# Patient Record
Sex: MALE | Race: WHITE | NOT HISPANIC OR LATINO | Employment: FULL TIME | URBAN - METROPOLITAN AREA
[De-identification: names, ages, dates, MRNs, and addresses within clinical notes are randomized per-mention and may not be internally consistent; named-entity substitution may affect disease eponyms.]

---

## 2019-08-01 ENCOUNTER — TRANSCRIBE ORDERS (OUTPATIENT)
Dept: ADMINISTRATIVE | Facility: HOSPITAL | Age: 59
End: 2019-08-01

## 2019-08-01 DIAGNOSIS — G89.29 CHRONIC RIGHT SHOULDER PAIN: Primary | ICD-10-CM

## 2019-08-01 DIAGNOSIS — M25.511 CHRONIC RIGHT SHOULDER PAIN: Primary | ICD-10-CM

## 2019-08-07 ENCOUNTER — HOSPITAL ENCOUNTER (OUTPATIENT)
Dept: GENERAL RADIOLOGY | Facility: HOSPITAL | Age: 59
Discharge: HOME | End: 2019-08-07
Admitting: ORTHOPAEDIC SURGERY

## 2019-08-07 ENCOUNTER — HOSPITAL ENCOUNTER (OUTPATIENT)
Dept: GENERAL RADIOLOGY | Facility: HOSPITAL | Age: 59
End: 2019-08-07

## 2019-08-07 ENCOUNTER — HOSPITAL ENCOUNTER (OUTPATIENT)
Dept: MRI IMAGING | Facility: HOSPITAL | Age: 59
Discharge: HOME | End: 2019-08-07

## 2019-08-07 ENCOUNTER — APPOINTMENT (OUTPATIENT)
Dept: MRI IMAGING | Facility: HOSPITAL | Age: 59
End: 2019-08-07

## 2019-08-07 DIAGNOSIS — M25.511 CHRONIC RIGHT SHOULDER PAIN: ICD-10-CM

## 2019-08-07 DIAGNOSIS — G89.29 CHRONIC RIGHT SHOULDER PAIN: ICD-10-CM

## 2019-08-07 PROCEDURE — 73222 MRI JOINT UPR EXTREM W/DYE: CPT

## 2019-08-07 PROCEDURE — 25010000002 IOPAMIDOL 61 % SOLUTION: Performed by: RADIOLOGY

## 2019-08-07 PROCEDURE — 25010000003 LIDOCAINE 1 % SOLUTION: Performed by: RADIOLOGY

## 2019-08-07 PROCEDURE — A9577 INJ MULTIHANCE: HCPCS | Performed by: RADIOLOGY

## 2019-08-07 PROCEDURE — 0 GADOBENATE DIMEGLUMINE 529 MG/ML SOLUTION: Performed by: RADIOLOGY

## 2019-08-07 PROCEDURE — 77002 NEEDLE LOCALIZATION BY XRAY: CPT

## 2019-08-07 RX ORDER — LIDOCAINE HYDROCHLORIDE 10 MG/ML
10 INJECTION, SOLUTION INFILTRATION; PERINEURAL ONCE
Status: COMPLETED | OUTPATIENT
Start: 2019-08-07 | End: 2019-08-07

## 2019-08-07 RX ADMIN — LIDOCAINE HYDROCHLORIDE 2 ML: 10 INJECTION, SOLUTION INFILTRATION; PERINEURAL at 08:39

## 2019-08-07 RX ADMIN — IOPAMIDOL 3 ML: 612 INJECTION, SOLUTION INTRAVENOUS at 08:39

## 2019-08-07 RX ADMIN — GADOBENATE DIMEGLUMINE 0.05 ML: 529 INJECTION, SOLUTION INTRAVENOUS at 08:39

## 2019-08-29 ENCOUNTER — TRANSCRIBE ORDERS (OUTPATIENT)
Dept: PHYSICAL THERAPY | Facility: CLINIC | Age: 59
End: 2019-08-29

## 2019-08-29 ENCOUNTER — OFFICE VISIT (OUTPATIENT)
Dept: PHYSICAL THERAPY | Facility: CLINIC | Age: 59
End: 2019-08-29

## 2019-08-29 DIAGNOSIS — M25.511 ACUTE PAIN OF RIGHT SHOULDER: Primary | ICD-10-CM

## 2019-08-29 DIAGNOSIS — M75.81 TENDINITIS OF RIGHT ROTATOR CUFF: Primary | ICD-10-CM

## 2019-08-29 PROCEDURE — 97110 THERAPEUTIC EXERCISES: CPT | Performed by: PHYSICAL THERAPIST

## 2019-08-29 PROCEDURE — 97161 PT EVAL LOW COMPLEX 20 MIN: CPT | Performed by: PHYSICAL THERAPIST

## 2019-08-29 PROCEDURE — 97014 ELECTRIC STIMULATION THERAPY: CPT | Performed by: PHYSICAL THERAPIST

## 2019-08-29 NOTE — PROGRESS NOTES
Physical Therapy Initial Evaluation and Plan of Care        Subjective Evaluation    History of Present Illness  Mechanism of injury: Patient reports that 12 years ago he had surgery for a torn labral tear which he recovered completely.  Two years ago he started having increased right shoulder pain which has been progressively been getting worse and limiting function.  He states that he can no longer lift arm over shoulder, reach forward, sleep on right side or lift anything more than a glass of water without increased pain. Also having problems with cutting grass and general household activities.        Job Details:  [employer]   - mostly driving [employer]                Quality of life: good    Pain  Quality: sharp, dull ache and throbbing  Alleviating factors: anti inflammatory as needed.  Aggravating factors: sleeping, outstretched reach and overhead activity    Social Support  Lives with: spouse    Hand dominance: right    Diagnostic Tests  MRI studies: abnormal    Patient Goals  Patient goals for therapy: decreased pain, increased strength, increased motion, return to sport/leisure activities and independence with ADLs/IADLs             Objective       Static Posture     Head  Forward.    Shoulders  Asymmetric shoulders and depressed.    Postural Observations  Seated posture: fair  Standing posture: fair  Correction of posture: has no consistent effect        Palpation     Right   Hypertonic in the anterior deltoid and biceps. Tenderness of the anterior deltoid, biceps and upper trapezius.     Tenderness     Right Shoulder  Tenderness in the biceps tendon (proximal), bicipital groove and supraspinatus tendon.     Active Range of Motion     Right Shoulder   Flexion: 85 degrees   Abduction: 65 degrees   External rotation 45°: 30 degrees     Strength/Myotome Testing     Right Shoulder     Planes of Motion   Flexion: 3+   Abduction: 3+   External rotation at 0°: 3     Additional Strength  Details  Atrophy noted along right upper trap and serratus anterior    Tests     Right Shoulder   Positive drop arm, empty can and sulcus sign.   Negative Hawkin's.          Assessment & Plan     Assessment  Impairments: abnormal or restricted ROM, activity intolerance, lacks appropriate home exercise program and pain with function  Assessment details: Patient is a 58 y/o male who present with chronic right shoulder pain in dominant limb.  He has poor postural awareness with elevated right scapular and rounded shoulder.  Atrophy noted along upper trap on Right.  Rom is significantly limited secondary to pain.  Strength limited to 3+/5 throughout except for ER with is 3/5 and very painful.   Prognosis: good  Functional Limitations: carrying objects, lifting, uncomfortable because of pain, reaching behind back and reaching overhead  Goals  Plan Goals: Long Term Goals (12 weeks)    Patient is able to return to work/community activities with minimal to no discomfort  Patient is able to lift 15 lbs from floor to waist  Patient is able to lift 10 lbs from waist to shoulder  Patient is able to lift 5 lbs from shoulder to overhead  Patient is able to work overhead as needed for work/community activities.       Short Term Goals (6 weeks)    Patient is independent with HEP  Patient is able to sleep without being disrupted by pain.   Patient has full AROM/PROM of right shoulder  Patient has greater than 50% improvement overall.   Patient is able to perform bathing and grooming activities with use of involved UE extremity.   Patient has functional AROM  in order to look over shoulder to drive.   Patient has minimal to no tenderness to palpation.                   Plan  Therapy options: will be seen for skilled physical therapy services  Planned modality interventions: TENS, ultrasound, thermotherapy (hydrocollator packs) and cryotherapy  Planned therapy interventions: manual therapy, soft tissue mobilization, strengthening,  stretching, therapeutic activities, joint mobilization, home exercise program, functional ROM exercises, flexibility and body mechanics training  Frequency: 24 visits.  Treatment plan discussed with: patient        Manual Therapy:    0     mins  43818;  Therapeutic Exercise:    25     mins  48654;     Neuromuscular Geronimo:    0    mins  88134;    Therapeutic Activity:     0     mins  01375;     Gait Trainin     mins  93997;     Ultrasound:     0     mins  10137;    Work Hardening           0      mins 34836  Iontophoresis               0   mins 88941    Timed Treatment:   25   mins   Total Treatment:     55   mins    PT SIGNATURE: Allie Villafuerte, PT   DATE TREATMENT INITIATED: 2019    Initial Certification  Certification Period: 2019  I certify that the therapy services are furnished while this patient is under my care.  The services outlined above are required by this patient, and will be reviewed every 90 days.     PHYSICIAN:       DATE:     Please sign and return via fax to [phone].. Thank you, [facility] Therapy.

## 2019-09-04 ENCOUNTER — OFFICE VISIT (OUTPATIENT)
Dept: PHYSICAL THERAPY | Facility: CLINIC | Age: 59
End: 2019-09-04

## 2019-09-04 DIAGNOSIS — M25.511 ACUTE PAIN OF RIGHT SHOULDER: Primary | ICD-10-CM

## 2019-09-04 PROCEDURE — 97110 THERAPEUTIC EXERCISES: CPT | Performed by: PHYSICAL THERAPIST

## 2019-09-04 PROCEDURE — 97014 ELECTRIC STIMULATION THERAPY: CPT | Performed by: PHYSICAL THERAPIST

## 2019-09-04 NOTE — PROGRESS NOTES
Physical Therapy Daily Progress Note      Visit # 2      Subjective   Hurting bad after first visit for several days    Objective   See Exercise, Manual, and Modality Logs for complete treatment.   PROM ~ full with pain only at ~ 90 FF    Assessment/Plan    Easily aggravated by certain movements but able to complete most ex today with complaint of only mild increase discomfort at end of session    Progress scap and RC ex gradually as nedra           Manual Therapy:    0     mins  62316;  Therapeutic Exercise:    25     mins  95565;         Timed Treatment:   25   mins   Total Treatment:     40   mins    Johanna Syed PT  Physical Therapist

## 2019-09-11 ENCOUNTER — OFFICE VISIT (OUTPATIENT)
Dept: PHYSICAL THERAPY | Facility: CLINIC | Age: 59
End: 2019-09-11

## 2019-09-11 DIAGNOSIS — M25.511 ACUTE PAIN OF RIGHT SHOULDER: Primary | ICD-10-CM

## 2019-09-11 PROCEDURE — 97014 ELECTRIC STIMULATION THERAPY: CPT | Performed by: PHYSICAL THERAPIST

## 2019-09-11 PROCEDURE — 97110 THERAPEUTIC EXERCISES: CPT | Performed by: PHYSICAL THERAPIST

## 2019-09-11 NOTE — PROGRESS NOTES
Physical Therapy Daily Progress Note      Visit # 3      Subjective   Honestly the same.  Still aching bad almost constantly. Catching a lot.    Objective   See Exercise, Manual, and Modality Logs for complete treatment.     ~ full PROM with mild EOR pain and with catch returning from FF       Assessment/Plan    Very limited nedra for any active exercise despite ~ full painfree PROM.  RC easily aggravated along with labral instability sxs.             Manual Therapy:    4     mins  30272;  Therapeutic Exercise:    20     mins  53502;       Timed Treatment:   24   mins   Total Treatment:     39   mins    Johanna Syed PT  Physical Therapist

## 2019-09-16 ENCOUNTER — OFFICE VISIT (OUTPATIENT)
Dept: PHYSICAL THERAPY | Facility: CLINIC | Age: 59
End: 2019-09-16

## 2019-09-16 DIAGNOSIS — M25.511 ACUTE PAIN OF RIGHT SHOULDER: Primary | ICD-10-CM

## 2019-09-16 PROCEDURE — 97014 ELECTRIC STIMULATION THERAPY: CPT | Performed by: PHYSICAL THERAPIST

## 2019-09-16 PROCEDURE — 97110 THERAPEUTIC EXERCISES: CPT | Performed by: PHYSICAL THERAPIST

## 2019-09-16 NOTE — PROGRESS NOTES
Physical Therapy Daily Progress Note    Visit # : 4  Manfred Nunez reports: my shoulder is still painful; I feel like this is a waste of time and money.  It even hurts to sit and hold a magazine.     Subjective     Objective   See Exercise, Manual, and Modality Logs for complete treatment.       Assessment/Plan  Exercises were regressed secondary to persistent pain complaints.  Pt was educated on importance of submax effort on isometrics to avoid increasing symptoms.   Progress strengthening /stabilization /functional activity  Assess for MD       Manual Therapy:    -     mins  93204;  Therapeutic Exercise:    25     mins  81335;     Neuromuscular Geronimo:    -    mins  35344;    Therapeutic Activity:     -     mins  39386;     Gait Training:      -     mins  62115;     Ultrasound:     -     mins  29643;    Electrical Stimulation:    15     mins  63262 ( );    Timed Treatment:   25   mins   Total Treatment:     40   mins      Fay Cisse PT  Physical Therapist  [place] License # [id number]

## 2019-09-16 NOTE — PATIENT INSTRUCTIONS
Access Code: [email or web address]   URL: [email or web address]   Date: 09/16/2019   Prepared by: Ana Lilia Cisse     Exercises   Circular Shoulder Pendulum with Table Support - 20 reps - 2 sets - 3x daily   Seated Isometric Shoulder External Rotation - 10 reps - 1 sets - 5 hold - 2x daily   Seated Isometric Shoulder Internal Rotation with Towel - 10 reps - 1 sets - 5 hold - 2x daily

## 2019-09-18 ENCOUNTER — TREATMENT (OUTPATIENT)
Dept: PHYSICAL THERAPY | Facility: CLINIC | Age: 59
End: 2019-09-18

## 2019-09-18 DIAGNOSIS — M25.511 ACUTE PAIN OF RIGHT SHOULDER: Primary | ICD-10-CM

## 2019-09-18 PROCEDURE — 97014 ELECTRIC STIMULATION THERAPY: CPT | Performed by: PHYSICAL THERAPIST

## 2019-09-18 PROCEDURE — 97110 THERAPEUTIC EXERCISES: CPT | Performed by: PHYSICAL THERAPIST

## 2019-09-18 NOTE — PROGRESS NOTES
Physical Therapy  Progress Note        2019  Valdo Gamboa MD    Re: Manfred Nunez  ________________________________________________________________    Mr. Manfred Nunez, has attended 4 PT sessions.  Treatment has consisted of: gentle there-ex and HEP, brief manual/NMR (very limited tolerance), modalities and pt education     S: Mr. Manfred Nunez states: hurting bad since visit on Monday.  Going to make this my last visit because I don't feel it's  helping.  Hurts to lift a coffee cup.        Subjective Evaluation    Pain  Current pain ratin  At best pain ratin  At worst pain ratin           Objective       Static Posture     Head  Forward.    Shoulders  Asymmetric shoulders and depressed.    Postural Observations  Seated posture: fair  Standing posture: fair  Correction of posture: has no consistent effect        Palpation     Right   Hypertonic in the anterior deltoid and biceps. Tenderness of the biceps.     Tenderness     Right Shoulder  Tenderness in the biceps tendon (proximal) and bicipital groove.     Active Range of Motion   Left Shoulder   Internal rotation BTB: T7     Right Shoulder   Flexion: 130 (pain and sl substitution begins at 85) degrees   Abduction: 165 (pain and some substitution) degrees   External rotation BTH: T3 (with pain and mod effort)   Internal rotation BTB: T9     Additional Active Range of Motion Details  Left shoulder hypermobility    Strength/Myotome Testing     Right Shoulder     Planes of Motion   Flexion: 5   Abduction: 3   External rotation at 0°: 3   Internal rotation at 0°: 5     Additional Strength Details  Atrophy noted along right upper trap and serratus anterior    Tests     Right Shoulder   Positive empty can, Hawkin's, Speed's and sulcus sign.   Negative drop arm.      See Exercise, Manual, and Modality Logs for complete treatment.       Assessment/Plan    No subjective improvement with PT - very easily aggravated by even very gentle exercises or  attempt at manual.  Has maintained good available PROM but moderate pain with some substitution with AROM.  Signs/sxs instability with labral and biceps involvement.    D/C formal PT at this time due to very low tolerance and at request of patient.             Manual Therapy:    0     mins  31016;  Therapeutic Exercise:    25     mins  35444;         Timed Treatment:   25   mins   Total Treatment:     40   mins    Johanna Syed, PT  Physical Therapist

## 2019-10-21 ENCOUNTER — TRANSCRIBE ORDERS (OUTPATIENT)
Dept: CARDIOLOGY | Facility: HOSPITAL | Age: 59
End: 2019-10-21

## 2019-10-21 ENCOUNTER — HOSPITAL ENCOUNTER (OUTPATIENT)
Dept: CARDIOLOGY | Facility: HOSPITAL | Age: 59
Discharge: HOME | End: 2019-10-21
Admitting: ORTHOPAEDIC SURGERY

## 2019-10-21 ENCOUNTER — HOSPITAL ENCOUNTER (OUTPATIENT)
Dept: GENERAL RADIOLOGY | Facility: HOSPITAL | Age: 59
Discharge: HOME | End: 2019-10-21

## 2019-10-21 ENCOUNTER — LAB (OUTPATIENT)
Dept: LAB | Facility: HOSPITAL | Age: 59
End: 2019-10-21

## 2019-10-21 ENCOUNTER — TRANSCRIBE ORDERS (OUTPATIENT)
Dept: LAB | Facility: HOSPITAL | Age: 59
End: 2019-10-21

## 2019-10-21 DIAGNOSIS — Z01.818 PRE-OP TESTING: ICD-10-CM

## 2019-10-21 DIAGNOSIS — Z01.818 PRE-OP TESTING: Primary | ICD-10-CM

## 2019-10-21 DIAGNOSIS — Z01.811 PRE-OP CHEST EXAM: ICD-10-CM

## 2019-10-21 LAB
ANION GAP SERPL CALCULATED.3IONS-SCNC: 9.8 MMOL/L (ref 5–15)
BASOPHILS # BLD AUTO: 0.07 10*3/MM3 (ref 0–0.2)
BASOPHILS NFR BLD AUTO: 0.8 % (ref 0–1.5)
BUN BLD-MCNC: 20 MG/DL (ref 6–20)
BUN/CREAT SERPL: 13.8 (ref 7–25)
CALCIUM SPEC-SCNC: 9 MG/DL (ref 8.6–10.5)
CHLORIDE SERPL-SCNC: 102 MMOL/L (ref 98–107)
CO2 SERPL-SCNC: 27.2 MMOL/L (ref 22–29)
CREAT BLD-MCNC: 1.45 MG/DL (ref 0.76–1.27)
DEPRECATED RDW RBC AUTO: 39.3 FL (ref 37–54)
EOSINOPHIL # BLD AUTO: 0.32 10*3/MM3 (ref 0–0.4)
EOSINOPHIL NFR BLD AUTO: 3.7 % (ref 0.3–6.2)
ERYTHROCYTE [DISTWIDTH] IN BLOOD BY AUTOMATED COUNT: 12.3 % (ref 12.3–15.4)
GFR SERPL CREATININE-BSD FRML MDRD: 50 ML/MIN/1.73
GLUCOSE BLD-MCNC: 110 MG/DL (ref 65–99)
HCT VFR BLD AUTO: 44.8 % (ref 37.5–51)
HGB BLD-MCNC: 15.3 G/DL (ref 13–17.7)
IMM GRANULOCYTES # BLD AUTO: 0.07 10*3/MM3 (ref 0–0.05)
IMM GRANULOCYTES NFR BLD AUTO: 0.8 % (ref 0–0.5)
LYMPHOCYTES # BLD AUTO: 1.98 10*3/MM3 (ref 0.7–3.1)
LYMPHOCYTES NFR BLD AUTO: 22.6 % (ref 19.6–45.3)
MCH RBC QN AUTO: 29.9 PG (ref 26.6–33)
MCHC RBC AUTO-ENTMCNC: 34.2 G/DL (ref 31.5–35.7)
MCV RBC AUTO: 87.7 FL (ref 79–97)
MONOCYTES # BLD AUTO: 0.8 10*3/MM3 (ref 0.1–0.9)
MONOCYTES NFR BLD AUTO: 9.1 % (ref 5–12)
NEUTROPHILS # BLD AUTO: 5.52 10*3/MM3 (ref 1.7–7)
NEUTROPHILS NFR BLD AUTO: 63 % (ref 42.7–76)
NRBC BLD AUTO-RTO: 0 /100 WBC (ref 0–0.2)
PLATELET # BLD AUTO: 335 10*3/MM3 (ref 140–450)
PMV BLD AUTO: 10.5 FL (ref 6–12)
POTASSIUM BLD-SCNC: 4.1 MMOL/L (ref 3.5–5.2)
RBC # BLD AUTO: 5.11 10*6/MM3 (ref 4.14–5.8)
SODIUM BLD-SCNC: 139 MMOL/L (ref 136–145)
WBC NRBC COR # BLD: 8.76 10*3/MM3 (ref 3.4–10.8)

## 2019-10-21 PROCEDURE — 85025 COMPLETE CBC W/AUTO DIFF WBC: CPT

## 2019-10-21 PROCEDURE — 36415 COLL VENOUS BLD VENIPUNCTURE: CPT

## 2019-10-21 PROCEDURE — 80048 BASIC METABOLIC PNL TOTAL CA: CPT

## 2019-10-21 PROCEDURE — 93005 ELECTROCARDIOGRAM TRACING: CPT | Performed by: ORTHOPAEDIC SURGERY

## 2019-10-21 PROCEDURE — 93010 ELECTROCARDIOGRAM REPORT: CPT | Performed by: INTERNAL MEDICINE

## 2019-10-21 PROCEDURE — 71046 X-RAY EXAM CHEST 2 VIEWS: CPT

## 2019-11-20 ENCOUNTER — TRANSCRIBE ORDERS (OUTPATIENT)
Dept: PHYSICAL THERAPY | Facility: CLINIC | Age: 59
End: 2019-11-20

## 2019-11-20 ENCOUNTER — TREATMENT (OUTPATIENT)
Dept: PHYSICAL THERAPY | Facility: CLINIC | Age: 59
End: 2019-11-20

## 2019-11-20 DIAGNOSIS — M25.511 ACUTE PAIN OF RIGHT SHOULDER: Primary | ICD-10-CM

## 2019-11-20 DIAGNOSIS — M25.511 RIGHT SHOULDER PAIN, UNSPECIFIED CHRONICITY: Primary | ICD-10-CM

## 2019-11-20 DIAGNOSIS — Z98.890 H/O ARTHROSCOPY OF SHOULDER: ICD-10-CM

## 2019-11-20 PROCEDURE — 97110 THERAPEUTIC EXERCISES: CPT | Performed by: PHYSICAL THERAPIST

## 2019-11-20 PROCEDURE — 97161 PT EVAL LOW COMPLEX 20 MIN: CPT | Performed by: PHYSICAL THERAPIST

## 2019-11-20 PROCEDURE — 97140 MANUAL THERAPY 1/> REGIONS: CPT | Performed by: PHYSICAL THERAPIST

## 2019-11-20 NOTE — PROGRESS NOTES
Physical Therapy Initial Evaluation and Plan of Care        Subjective Evaluation    History of Present Illness  Date of surgery: 10/25/2019  Mechanism of injury: Patient reports that 12 years ago he had surgery for a torn labral tear which he recovered completely.  Two years ago he started having increased right shoulder pain which has been progressively been getting worse and limiting function.  At time of initial eval in 2019  states that he can no longer lift arm over shoulder, reach forward, sleep on right side or lift anything more than a glass of water without increased pain. Also having problems with cutting grass and general household activities. PT x 5 - stated made it worse.    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[employer]                  Patient Occupation:    Precautions and Work Restrictions: offQuality of life: good    Pain  Current pain ratin  At best pain ratin  At worst pain ratin  Location: mid arm and sup shdr  Quality: sharp, dull ache and throbbing  Relieving factors: medications  Aggravating factors: sleeping and movement  Progression: improved (pain in arm is worse though)    Social Support  Lives with: spouse    Hand dominance: right    Diagnostic Tests  MRI studies: abnormal    Treatments  Previous treatment: physical therapy, immobilization and medication  Current treatment: medication  Patient Goals  Patient goals for therapy: decreased pain, increased strength, increased motion, return to sport/leisure activities, independence with ADLs/IADLs and return to work             Objective       Postural Observations  Seated posture: fair    Additional Postural Observation Details  FHB    Tenderness     Right Shoulder  Tenderness in the AC joint.     Additional Tenderness Details  All portals mildly tender - healing  well    Cervical/Thoracic Screen   Cervical range of motion within normal limits with the following exceptions: WFL    Active Range of Motion     Additional Active Range of Motion Details  shdr deferred; wrist/hand WNL    Passive Range of Motion     Right Shoulder   Flexion: 140 degrees   Abduction: 112 (scap plane) degrees   External rotation 0°: 47 degrees   Internal rotation 0°: 70 (arm rested at abdomen) degrees     Scapular Mobility     Right Shoulder   Scapular mobility: fair    Strength/Myotome Testing     Additional Strength Details  deferred         Assessment & Plan     Assessment  Impairments: abnormal or restricted ROM, activity intolerance, impaired physical strength and pain with function  Assessment details: 58 yo m presents 3.5 wks s/p arthroscopy for RC, biceps and labral tears with limited mobility, strength and function as expected. Needs gradually progressive rehab to address these deficits and restore to normal ADLs with minimal pain  Prognosis: good  Functional Limitations: carrying objects, lifting, sleeping, pulling, pushing, reaching behind back and reaching overhead  Goals  Plan Goals: STGs (3 weeks)    1. Patient demonstrates compliance with HEP and precautions  2. Patient demonstrates progress with P/AAROM per expectations  3. Patient reports decreased sleep disturbance  4. Patient reports pain with exercises and light ADLs (eg. Grooming) < 5/10    LTGs (3 months)    1. Patient demonstrates independence with HEP and joint protection principles  2. AROM > WFL with min to no pain and strength > 4/5 for improved ADLs  3. Patient presents with min to no signs/sxs of impingement  4. Patient demonstrates tolerance for return to prior level ADLs with min to no restrictions    Plan  Therapy options: will be seen for skilled physical therapy services  Planned modality interventions: cryotherapy and electrical stimulation/Russian stimulation  Planned therapy interventions: manual therapy,  stretching, strengthening, therapeutic activities, home exercise program, functional ROM exercises and body mechanics training  Frequency: 3x week  Duration in weeks: 12  Treatment plan discussed with: patient        Manual Therapy:    10     mins  80070;  Therapeutic Exercise:    14   mins  77949;         Timed Treatment:   24   mins   Total Treatment:     65   mins    PT SIGNATURE: Johanna Syed PT   DATE TREATMENT INITIATED: 11/20/2019    Initial Certification  Certification Period: 2/18/2020  I certify that the therapy services are furnished while this patient is under my care.  The services outlined above are required by this patient, and will be reviewed every 90 days.     PHYSICIAN:  Valdo Gamboa MD     DATE:     Please sign and return via fax to [phone].. Thank you, [facility] Physical Therapy.

## 2019-11-22 ENCOUNTER — TREATMENT (OUTPATIENT)
Dept: PHYSICAL THERAPY | Facility: CLINIC | Age: 59
End: 2019-11-22

## 2019-11-22 DIAGNOSIS — M25.511 ACUTE PAIN OF RIGHT SHOULDER: Primary | ICD-10-CM

## 2019-11-22 DIAGNOSIS — Z98.890 H/O ARTHROSCOPY OF SHOULDER: ICD-10-CM

## 2019-11-22 PROCEDURE — 97110 THERAPEUTIC EXERCISES: CPT | Performed by: PHYSICAL THERAPIST

## 2019-11-22 PROCEDURE — 97140 MANUAL THERAPY 1/> REGIONS: CPT | Performed by: PHYSICAL THERAPIST

## 2019-11-22 PROCEDURE — 97014 ELECTRIC STIMULATION THERAPY: CPT | Performed by: PHYSICAL THERAPIST

## 2019-11-22 NOTE — PROGRESS NOTES
Physical Therapy Daily Progress Note      Visit # 7      Subjective   Hurting a lot.  Iced it but only helped a little. Had not been taking pain pills but did take one last night.    Objective   See Exercise, Manual, and Modality Logs for complete treatment.       Assessment/Plan    Still with mod-severe pain.  Low vs high painful arc. Limited nedra for progression of ex as was feeling light headed and with mod-severe pain.    Progress per protocol as nedra             Manual Therapy:    13     mins  37366;  Therapeutic Exercise:    24     mins  48109;       E-stim               15   mins 41592    Timed Treatment:   37   mins   Total Treatment:     52   mins    Johanna Syed, ANDREW  Physical Therapist

## 2019-11-26 ENCOUNTER — TREATMENT (OUTPATIENT)
Dept: PHYSICAL THERAPY | Facility: CLINIC | Age: 59
End: 2019-11-26

## 2019-11-26 DIAGNOSIS — M25.511 ACUTE PAIN OF RIGHT SHOULDER: Primary | ICD-10-CM

## 2019-11-26 DIAGNOSIS — Z98.890 H/O ARTHROSCOPY OF SHOULDER: ICD-10-CM

## 2019-11-26 PROCEDURE — 97110 THERAPEUTIC EXERCISES: CPT | Performed by: PHYSICAL THERAPIST

## 2019-11-26 PROCEDURE — 97140 MANUAL THERAPY 1/> REGIONS: CPT | Performed by: PHYSICAL THERAPIST

## 2019-11-26 PROCEDURE — 97014 ELECTRIC STIMULATION THERAPY: CPT | Performed by: PHYSICAL THERAPIST

## 2019-11-26 NOTE — PROGRESS NOTES
Physical Therapy Daily Progress Note      Visit # 8      Subjective   Better than Friday.  Not exercising every day.    Objective   See Exercise, Manual, and Modality Logs for complete treatment.       Assessment/Plan    Still with mod-severe low painful arc but improved range overall and improved nedra for ex.  Educated on imprortance of exercise compliance    Progress per protocol as nedra             Manual Therapy:    12     mins  40257;  Therapeutic Exercise:    25     mins  61844;       E-stim              15   mins 11619    Timed Treatment:   37   mins   Total Treatment:     52   mins    Johanna Syed PT  Physical Therapist

## 2019-11-27 ENCOUNTER — TREATMENT (OUTPATIENT)
Dept: PHYSICAL THERAPY | Facility: CLINIC | Age: 59
End: 2019-11-27

## 2019-11-27 DIAGNOSIS — M25.511 ACUTE PAIN OF RIGHT SHOULDER: Primary | ICD-10-CM

## 2019-11-27 DIAGNOSIS — Z98.890 H/O ARTHROSCOPY OF SHOULDER: ICD-10-CM

## 2019-11-27 PROCEDURE — 97140 MANUAL THERAPY 1/> REGIONS: CPT | Performed by: PHYSICAL THERAPIST

## 2019-11-27 PROCEDURE — 97014 ELECTRIC STIMULATION THERAPY: CPT | Performed by: PHYSICAL THERAPIST

## 2019-11-27 PROCEDURE — 97110 THERAPEUTIC EXERCISES: CPT | Performed by: PHYSICAL THERAPIST

## 2019-11-27 NOTE — PROGRESS NOTES
Physical Therapy Daily Progress Note      Visit # 9      Subjective   A tad bit better     Objective   See Exercise, Manual, and Modality Logs for complete treatment.       Assessment/Plan    Still with low impingement but improved with gentle mobs.  Tolerated light progression of ex but still not tolerating triceps/inf capsule stretch    Progress as nedra - try prone partial extension             Manual Therapy:    13     mins  06223;  Therapeutic Exercise:    27     mins  80361;     E-stim                 15   mins 40459    Timed Treatment:   40   mins   Total Treatment:     55   mins    Johanna Syed, PT  Physical Therapist

## 2019-12-02 ENCOUNTER — TREATMENT (OUTPATIENT)
Dept: PHYSICAL THERAPY | Facility: CLINIC | Age: 59
End: 2019-12-02

## 2019-12-02 DIAGNOSIS — Z98.890 H/O ARTHROSCOPY OF SHOULDER: ICD-10-CM

## 2019-12-02 DIAGNOSIS — M25.511 ACUTE PAIN OF RIGHT SHOULDER: Primary | ICD-10-CM

## 2019-12-02 PROCEDURE — 97140 MANUAL THERAPY 1/> REGIONS: CPT | Performed by: PHYSICAL THERAPIST

## 2019-12-02 PROCEDURE — 97110 THERAPEUTIC EXERCISES: CPT | Performed by: PHYSICAL THERAPIST

## 2019-12-02 PROCEDURE — 97014 ELECTRIC STIMULATION THERAPY: CPT | Performed by: PHYSICAL THERAPIST

## 2019-12-02 NOTE — PROGRESS NOTES
Physical Therapy Daily Progress Note      Visit # 10      Subjective   Doing better but it still concerns me - the biceps hurts all the time    Objective   See Exercise, Manual, and Modality Logs for complete treatment.       Assessment/Plan    Still with low painful arc with intermittent moderate impingement but overall improved from past sessions.  Good tolerance for CKC flexion.  Tolerating gradual progression of ex.    Progress per protocol as nedra             Manual Therapy:    12     mins  94798;  Therapeutic Exercise:    29     mins  11953;       E-stim               15   mins 04676    Timed Treatment:   41   mins   Total Treatment:     56   mins    Johanna Syed PT  Physical Therapist

## 2019-12-04 ENCOUNTER — TREATMENT (OUTPATIENT)
Dept: PHYSICAL THERAPY | Facility: CLINIC | Age: 59
End: 2019-12-04

## 2019-12-04 DIAGNOSIS — Z98.890 H/O ARTHROSCOPY OF SHOULDER: ICD-10-CM

## 2019-12-04 DIAGNOSIS — M25.511 ACUTE PAIN OF RIGHT SHOULDER: Primary | ICD-10-CM

## 2019-12-04 PROCEDURE — 97014 ELECTRIC STIMULATION THERAPY: CPT | Performed by: PHYSICAL THERAPIST

## 2019-12-04 PROCEDURE — 97110 THERAPEUTIC EXERCISES: CPT | Performed by: PHYSICAL THERAPIST

## 2019-12-04 PROCEDURE — 97140 MANUAL THERAPY 1/> REGIONS: CPT | Performed by: PHYSICAL THERAPIST

## 2019-12-04 NOTE — PROGRESS NOTES
Physical Therapy Daily Progress Note      Visit # 11      Subjective   Hurts more at night - still wearing sling to sleep.  Sore with new ex.    Objective   See Exercise, Manual, and Modality Logs for complete treatment.       Assessment/Plan    Continues with moderate painful arc with PROM but improving and available ROM increasing.  Still needing sling for sleep due to pain.    Progress ex per protocol as nedra             Manual Therapy:    12     mins  24968;  Therapeutic Exercise:    30     mins  83627;     E-stim              15   mins 95465    Timed Treatment:   42   mins   Total Treatment:     57   mins    Johanna Syed, PT  Physical Therapist

## 2019-12-06 ENCOUNTER — TREATMENT (OUTPATIENT)
Dept: PHYSICAL THERAPY | Facility: CLINIC | Age: 59
End: 2019-12-06

## 2019-12-06 DIAGNOSIS — M25.511 ACUTE PAIN OF RIGHT SHOULDER: Primary | ICD-10-CM

## 2019-12-06 DIAGNOSIS — Z98.890 H/O ARTHROSCOPY OF SHOULDER: ICD-10-CM

## 2019-12-06 PROCEDURE — 97140 MANUAL THERAPY 1/> REGIONS: CPT | Performed by: PHYSICAL THERAPIST

## 2019-12-06 PROCEDURE — 97014 ELECTRIC STIMULATION THERAPY: CPT | Performed by: PHYSICAL THERAPIST

## 2019-12-06 PROCEDURE — 97110 THERAPEUTIC EXERCISES: CPT | Performed by: PHYSICAL THERAPIST

## 2019-12-06 NOTE — PROGRESS NOTES
"Physical Therapy Daily Progress Note    Visit # : 12  Manfred Nunez reports: my shoulder is feeling about the same    Subjective     Objective   See Exercise, Manual, and Modality Logs for complete treatment.     Assessment/Plan  Pt continues to exhibit symptoms of \"catching\" ant shoulder with PROM.  Pt may be overusing bicep muscle at this time and was educated on tissue healing.   Progress strengthening /stabilization /functional activity       Manual Therapy:            12     mins  91344;  Therapeutic Exercise:    33     mins  16863;     E-stim                          15   mins 32012      Timed Treatment:   45   mins   Total Treatment:     60   mins      Fay Cisse, PT  Physical Therapist  [place] License # [id number]      "

## 2019-12-09 ENCOUNTER — TREATMENT (OUTPATIENT)
Dept: PHYSICAL THERAPY | Facility: CLINIC | Age: 59
End: 2019-12-09

## 2019-12-09 DIAGNOSIS — Z98.890 H/O ARTHROSCOPY OF SHOULDER: ICD-10-CM

## 2019-12-09 DIAGNOSIS — M25.511 ACUTE PAIN OF RIGHT SHOULDER: Primary | ICD-10-CM

## 2019-12-09 PROCEDURE — 97140 MANUAL THERAPY 1/> REGIONS: CPT | Performed by: PHYSICAL THERAPIST

## 2019-12-09 PROCEDURE — 97014 ELECTRIC STIMULATION THERAPY: CPT | Performed by: PHYSICAL THERAPIST

## 2019-12-09 PROCEDURE — 97110 THERAPEUTIC EXERCISES: CPT | Performed by: PHYSICAL THERAPIST

## 2019-12-09 NOTE — PROGRESS NOTES
Physical Therapy Daily Progress Note    Visit # : 13  Manfred Nunez reports: my shoulder is a tad bit better but sore; I did a lot of yard work/putting up luzmaria lights on Saturday and primarily used my L arm.  I was sore all over yesterday from going up and down ladder.    Subjective     Objective   See Exercise, Manual, and Modality Logs for complete treatment.     Assessment/Plan  Added gentle STM to R biceps today; pt continues to exhibit painful arc with manual stretching.  Pt exhibits soft end feel.  Pt cautioned to avoid overuse with ADLs.    Progress per Plan of Care       Manual Therapy:            15     mins  01346;  Therapeutic Exercise:    28     mins  53361;     E-stim                          15   mins 89302          Timed Treatment:   43   mins   Total Treatment:     60   mins      Fay Cisse PT  Physical Therapist  [place] License # [id number]

## 2019-12-09 NOTE — PATIENT INSTRUCTIONS
Access Code: [email or web address]   URL: [email or web address]   Date: 12/09/2019   Prepared by: Ana Lilia Cisse     Exercises   Standing Elbow Flexion Extension AROM - 20 reps - 1 sets - 3 hold - 1x daily   Standing Forearm Pronation and Supination AROM - 20 reps - 1 sets - 5 hold - 1x daily   Standing shoulder flexion wall slides - 15 reps - 1 sets - 5 hold - 1x daily

## 2019-12-11 ENCOUNTER — TREATMENT (OUTPATIENT)
Dept: PHYSICAL THERAPY | Facility: CLINIC | Age: 59
End: 2019-12-11

## 2019-12-11 DIAGNOSIS — M25.511 ACUTE PAIN OF RIGHT SHOULDER: Primary | ICD-10-CM

## 2019-12-11 DIAGNOSIS — Z98.890 H/O ARTHROSCOPY OF SHOULDER: ICD-10-CM

## 2019-12-11 PROCEDURE — 97014 ELECTRIC STIMULATION THERAPY: CPT | Performed by: PHYSICAL THERAPIST

## 2019-12-11 PROCEDURE — 97110 THERAPEUTIC EXERCISES: CPT | Performed by: PHYSICAL THERAPIST

## 2019-12-11 PROCEDURE — 97140 MANUAL THERAPY 1/> REGIONS: CPT | Performed by: PHYSICAL THERAPIST

## 2019-12-11 NOTE — PROGRESS NOTES
Physical Therapy  Progress Note        12/11/2019  Valdo Gamboa MD    Re: Manfred Nuenz  ________________________________________________________________    Mr. Manfred Nunez, has attended 14 PT sessions.  Treatment has consisted of: there-ex, HEP, manual, modalities, pt ed     S: Mr. Manfred Nunez states: more mobility but biceps continues to hurt.  Still needing to sleep with sling on.      Subjective     Objective       Passive Range of Motion     Right Shoulder   Flexion: 153 degrees   Abduction: 158 degrees   External rotation 45°: 68 degrees   Internal rotation 45°: 85 degrees      See Exercise, Manual, and Modality Logs for complete treatment.       Assessment/Plan    ROM showing good improvement with less painful arc/impingement but still with biceps pain moderate    Reassess for MD             Manual Therapy:    12     mins  81232;  Therapeutic Exercise:    28     mins  69973;     E-stim               15   mins 65304    Timed Treatment:   40   mins   Total Treatment:     55   mins    Johanna Syed PT  Physical Therapist

## 2019-12-12 ENCOUNTER — TREATMENT (OUTPATIENT)
Dept: PHYSICAL THERAPY | Facility: CLINIC | Age: 59
End: 2019-12-12

## 2019-12-12 PROCEDURE — 97014 ELECTRIC STIMULATION THERAPY: CPT | Performed by: PHYSICAL THERAPIST

## 2019-12-12 PROCEDURE — 97140 MANUAL THERAPY 1/> REGIONS: CPT | Performed by: PHYSICAL THERAPIST

## 2019-12-12 PROCEDURE — 97110 THERAPEUTIC EXERCISES: CPT | Performed by: PHYSICAL THERAPIST

## 2019-12-12 NOTE — PROGRESS NOTES
Physical Therapy  Progress Note        12/12/2019  Valdo Gamboa MD    Re: Manfred Nunez  ________________________________________________________________    Mr. Manfred Nunez, has attended 15 PT sessions.  Treatment has consisted of: gradually progressive the-ex, HEP, manual, modalities prn and pt education     S: Mr. Manfred Nunez states: getting more motion but biceps still sore and painful.  Still needing to wear sling for sleep.      Subjective     Objective       Tenderness     Right Shoulder  Tenderness in the AC joint and bicipital groove.     Active Range of Motion   Left Shoulder   Normal active range of motion    Right Shoulder   Flexion: 125 (some substitution) degrees   Abduction: 128 (notable effort) degrees   External rotation BTH: T2 (effort and pain)   Internal rotation BTB: T11     Passive Range of Motion     Right Shoulder   Flexion: 153 degrees   Abduction: 158 degrees   External rotation 45°: 68 degrees   Internal rotation 45°: 85 degrees     Strength/Myotome Testing     Right Shoulder     Planes of Motion   Flexion: 3-   Abduction: 3-   External rotation at 0°: 4-   Right shoulder internal rotation strength at 0 degrees: 5-/5.     Tests     Right Shoulder   Positive painful arc.   Negative drop arm.     Additional Tests Details  Mild pain with Empty Can and HK     See Exercise, Manual, and Modality Logs for complete treatment.       Assessment/Plan    Good progress overall with gradually increasing A/PROM and tolerance for ex.  Still with moderate weakness as expected.  Still with painful arc/impingement but this is decreasing to only mild now.  Needs continued, gradually progressive PT to restore functional ROM and strength    To MD             Manual Therapy:    12     mins  03112;  Therapeutic Exercise:    32     mins  68027;     E-stim                 15   mins 20181    Timed Treatment:   44   mins   Total Treatment:     59   mins    Johanna Syed PT  Physical Therapist

## 2019-12-16 ENCOUNTER — TREATMENT (OUTPATIENT)
Dept: PHYSICAL THERAPY | Facility: CLINIC | Age: 59
End: 2019-12-16

## 2019-12-16 DIAGNOSIS — Z98.890 H/O ARTHROSCOPY OF SHOULDER: ICD-10-CM

## 2019-12-16 DIAGNOSIS — M25.511 ACUTE PAIN OF RIGHT SHOULDER: Primary | ICD-10-CM

## 2019-12-16 PROCEDURE — 97110 THERAPEUTIC EXERCISES: CPT | Performed by: PHYSICAL THERAPIST

## 2019-12-16 PROCEDURE — 97014 ELECTRIC STIMULATION THERAPY: CPT | Performed by: PHYSICAL THERAPIST

## 2019-12-16 PROCEDURE — 97140 MANUAL THERAPY 1/> REGIONS: CPT | Performed by: PHYSICAL THERAPIST

## 2019-12-16 NOTE — PROGRESS NOTES
Physical Therapy Daily Progress Note      Visit # 16      Subjective   MD said doing better but biceps still healing.  A tad better vs last week.    Objective   See Exercise, Manual, and Modality Logs for complete treatment.       Assessment/Plan    Gradual improvement in ROM with dec pain and impingement.  Able to progress ex lightly    Continue to progress scap and RC ex as nedra; try swiss ball roll               Manual Therapy:    13     mins  07133;  Therapeutic Exercise:    29     mins  68474;       Estim   15 min 75578      Timed Treatment:   42   mins   Total Treatment:     57   mins    Johanna Syed PT  Physical Therapist  [place] License #[id number]

## 2019-12-18 ENCOUNTER — TREATMENT (OUTPATIENT)
Dept: PHYSICAL THERAPY | Facility: CLINIC | Age: 59
End: 2019-12-18

## 2019-12-18 DIAGNOSIS — Z98.890 H/O ARTHROSCOPY OF SHOULDER: ICD-10-CM

## 2019-12-18 DIAGNOSIS — M25.511 ACUTE PAIN OF RIGHT SHOULDER: Primary | ICD-10-CM

## 2019-12-18 PROCEDURE — 97014 ELECTRIC STIMULATION THERAPY: CPT | Performed by: PHYSICAL THERAPIST

## 2019-12-18 PROCEDURE — 97110 THERAPEUTIC EXERCISES: CPT | Performed by: PHYSICAL THERAPIST

## 2019-12-18 PROCEDURE — 97140 MANUAL THERAPY 1/> REGIONS: CPT | Performed by: PHYSICAL THERAPIST

## 2019-12-18 NOTE — PROGRESS NOTES
Physical Therapy Daily Progress Note      Visit # 17      Subjective   A tad better.  Sleeping without sling but wake up a few times at night.    Objective   See Exercise, Manual, and Modality Logs for complete treatment.       Assessment/Plan    ROM gradually improving with less pain and impingement. Fatigued easily with serratus punches and resistance with scap ret.    progress ex as nedra             Manual Therapy:    13     mins  66704;  Therapeutic Exercise:    33     mins  83341;     Estim   10 min 08137      Timed Treatment:   46   mins   Total Treatment:     56   mins    Johanna Syed PT  Physical Therapist  [place] License #[id number]

## 2019-12-20 ENCOUNTER — TREATMENT (OUTPATIENT)
Dept: PHYSICAL THERAPY | Facility: CLINIC | Age: 59
End: 2019-12-20

## 2019-12-20 DIAGNOSIS — M25.511 ACUTE PAIN OF RIGHT SHOULDER: Primary | ICD-10-CM

## 2019-12-20 DIAGNOSIS — Z98.890 H/O ARTHROSCOPY OF SHOULDER: ICD-10-CM

## 2019-12-20 PROCEDURE — 97110 THERAPEUTIC EXERCISES: CPT | Performed by: PHYSICAL THERAPIST

## 2019-12-20 PROCEDURE — 97014 ELECTRIC STIMULATION THERAPY: CPT | Performed by: PHYSICAL THERAPIST

## 2019-12-20 PROCEDURE — 97140 MANUAL THERAPY 1/> REGIONS: CPT | Performed by: PHYSICAL THERAPIST

## 2019-12-20 NOTE — PROGRESS NOTES
Physical Therapy Daily Progress Note      Visit # 18      Subjective   Hurt more than usual yesterday - trouble sleeping and had to take a pain pill. Not near as bad today.    Objective   See Exercise, Manual, and Modality Logs for complete treatment.       Assessment/Plan    Pain with ER in s/l but less pain with connor ER at near 90/90. Pain also with attempted supine serratus but tolerated serratus at wall.  No los of ROM and less pain/impingement on eccentric movements despite flare-up    Continue ex and progress as nedra             Manual Therapy:    13     mins  96066;  Therapeutic Exercise:    34     mins  41272;     Estim   15 min 53579      Timed Treatment:   47   mins   Total Treatment:     64   mins    Johanna Syed PT  Physical Therapist  [place] License #[id number]

## 2019-12-23 ENCOUNTER — TREATMENT (OUTPATIENT)
Dept: PHYSICAL THERAPY | Facility: CLINIC | Age: 59
End: 2019-12-23

## 2019-12-23 DIAGNOSIS — M25.511 ACUTE PAIN OF RIGHT SHOULDER: Primary | ICD-10-CM

## 2019-12-23 DIAGNOSIS — Z98.890 H/O ARTHROSCOPY OF SHOULDER: ICD-10-CM

## 2019-12-23 PROCEDURE — 97140 MANUAL THERAPY 1/> REGIONS: CPT | Performed by: PHYSICAL THERAPIST

## 2019-12-23 PROCEDURE — 97110 THERAPEUTIC EXERCISES: CPT | Performed by: PHYSICAL THERAPIST

## 2019-12-23 NOTE — PROGRESS NOTES
Physical Therapy Daily Progress Note      Visit # 19      Subjective   Better than it was on Friday but woke up this morning with it hurting    Objective   See Exercise, Manual, and Modality Logs for complete treatment.       Assessment/Plan    Still with some impingement signs but much less than previous.  Pain with resisted ER but pain is more in biceps /ant deltoid.  Able to progress several ex and PROM continues to improve.    Progress scap and RC as nedra             Manual Therapy:    12     mins  78946;  Therapeutic Exercise:    42     mins  77147;         Timed Treatment:  54    mins   Total Treatment:    54    mins    Johanna Syed PT  Physical Therapist  [place] License #[id number]

## 2019-12-26 ENCOUNTER — TREATMENT (OUTPATIENT)
Dept: PHYSICAL THERAPY | Facility: CLINIC | Age: 59
End: 2019-12-26

## 2019-12-26 DIAGNOSIS — Z98.890 H/O ARTHROSCOPY OF SHOULDER: ICD-10-CM

## 2019-12-26 DIAGNOSIS — M25.511 ACUTE PAIN OF RIGHT SHOULDER: Primary | ICD-10-CM

## 2019-12-26 PROCEDURE — 97110 THERAPEUTIC EXERCISES: CPT | Performed by: PHYSICAL THERAPIST

## 2019-12-26 PROCEDURE — 97140 MANUAL THERAPY 1/> REGIONS: CPT | Performed by: PHYSICAL THERAPIST

## 2019-12-26 NOTE — PROGRESS NOTES
Physical Therapy Daily Progress Note      Visit # 20      Subjective   Able to sleep ok last couple nights without needing brace    Objective   See Exercise, Manual, and Modality Logs for complete treatment.       Assessment/Plan    Continues with improvement with decreasing signs/sxs impingement and increasing ROM.  Strength slowly increasing as is nedra for ex.    Continue to progress ex as nedra             Manual Therapy:    10     mins  91198;  Therapeutic Exercise:    43     mins  24584;         Timed Treatment:   53   mins   Total Treatment:     53   mins    Johanna Syed PT  Physical Therapist  [place] License #[id number]

## 2019-12-27 ENCOUNTER — TREATMENT (OUTPATIENT)
Dept: PHYSICAL THERAPY | Facility: CLINIC | Age: 59
End: 2019-12-27

## 2019-12-27 DIAGNOSIS — M25.511 ACUTE PAIN OF RIGHT SHOULDER: Primary | ICD-10-CM

## 2019-12-27 DIAGNOSIS — Z98.890 H/O ARTHROSCOPY OF SHOULDER: ICD-10-CM

## 2019-12-27 PROCEDURE — 97140 MANUAL THERAPY 1/> REGIONS: CPT | Performed by: PHYSICAL THERAPIST

## 2019-12-27 PROCEDURE — 97110 THERAPEUTIC EXERCISES: CPT | Performed by: PHYSICAL THERAPIST

## 2019-12-27 PROCEDURE — 97014 ELECTRIC STIMULATION THERAPY: CPT | Performed by: PHYSICAL THERAPIST

## 2019-12-27 NOTE — PROGRESS NOTES
Physical Therapy Daily Progress Note      Visit # 21      Subjective   Woke up with a dull pain in biceps (distal) - felt pretty good when I went to bed with hardly any pain    Objective   See Exercise, Manual, and Modality Logs for complete treatment.       Assessment/Plan    Biceps flared up - possibly form sleeping so some exercises limited today.  A little more impingement today with PROM but still much improved from earlier on .    Resume and progress ex as nedra           Manual Therapy:    12     mins  72308;  Therapeutic Exercise:     40    mins  32625;       Estim   15 min 03776      Timed Treatment:   52   mins   Total Treatment:     67   mins    Johanna Syed PT  Physical Therapist  [place] License #[id number]

## 2019-12-30 ENCOUNTER — TREATMENT (OUTPATIENT)
Dept: PHYSICAL THERAPY | Facility: CLINIC | Age: 59
End: 2019-12-30

## 2019-12-30 DIAGNOSIS — M25.511 ACUTE PAIN OF RIGHT SHOULDER: Primary | ICD-10-CM

## 2019-12-30 DIAGNOSIS — Z98.890 H/O ARTHROSCOPY OF SHOULDER: ICD-10-CM

## 2019-12-30 PROCEDURE — 97110 THERAPEUTIC EXERCISES: CPT | Performed by: PHYSICAL THERAPIST

## 2019-12-30 PROCEDURE — 97140 MANUAL THERAPY 1/> REGIONS: CPT | Performed by: PHYSICAL THERAPIST

## 2019-12-30 PROCEDURE — 97014 ELECTRIC STIMULATION THERAPY: CPT | Performed by: PHYSICAL THERAPIST

## 2019-12-30 NOTE — PROGRESS NOTES
Physical Therapy Daily Progress Note      Visit # 22      Subjective   Yesterday was rough but felt better this morning.    Objective   See Exercise, Manual, and Modality Logs for complete treatment.       Assessment/Plan    More fluid wall slide but did not tolerate as much strengthening today.      Progress strengthening gradually as nedra             Manual Therapy:    14     mins  40631;  Therapeutic Exercise:    35     mins  64419;     Estim   15 min 87492      Timed Treatment:   49   mins   Total Treatment:     64   mins    Johanna Syed PT  Physical Therapist  [place] License #[id number]

## 2019-12-31 ENCOUNTER — TREATMENT (OUTPATIENT)
Dept: PHYSICAL THERAPY | Facility: CLINIC | Age: 59
End: 2019-12-31

## 2019-12-31 DIAGNOSIS — Z98.890 H/O ARTHROSCOPY OF SHOULDER: ICD-10-CM

## 2019-12-31 DIAGNOSIS — M25.511 ACUTE PAIN OF RIGHT SHOULDER: Primary | ICD-10-CM

## 2019-12-31 PROCEDURE — 97110 THERAPEUTIC EXERCISES: CPT | Performed by: PHYSICAL THERAPIST

## 2019-12-31 PROCEDURE — 97140 MANUAL THERAPY 1/> REGIONS: CPT | Performed by: PHYSICAL THERAPIST

## 2019-12-31 NOTE — PROGRESS NOTES
Physical Therapy Daily Progress Note      Visit # 23      Subjective   My shoulder feels so much better after yesterdays Rx    Objective   See Exercise, Manual, and Modality Logs for complete treatment.       Assessment/Plan    Good response to manual Rx yesterday with much less impingement and pain.  Needs continued strength and stab ex to improve mobility with dec pain and impingement    Progress scap and RC as nedra             Manual Therapy:    10     mins  59708;  Therapeutic Exercise:    33     mins  26831;         Timed Treatment:   43   mins   Total Treatment:     53   mins    Johanna Syed PT  Physical Therapist  [place] License #[id number]

## 2020-01-02 ENCOUNTER — TREATMENT (OUTPATIENT)
Dept: PHYSICAL THERAPY | Facility: CLINIC | Age: 60
End: 2020-01-02

## 2020-01-02 DIAGNOSIS — M25.511 ACUTE PAIN OF RIGHT SHOULDER: Primary | ICD-10-CM

## 2020-01-02 DIAGNOSIS — Z98.890 H/O ARTHROSCOPY OF SHOULDER: ICD-10-CM

## 2020-01-02 PROCEDURE — 97140 MANUAL THERAPY 1/> REGIONS: CPT | Performed by: PHYSICAL THERAPIST

## 2020-01-02 PROCEDURE — 97110 THERAPEUTIC EXERCISES: CPT | Performed by: PHYSICAL THERAPIST

## 2020-01-02 NOTE — PROGRESS NOTES
Physical Therapy Daily Progress Note      Visit # 24      Subjective   A tad better.  Biceps not bothering me near as much now.    Objective   See Exercise, Manual, and Modality Logs for complete treatment.       Assessment/Plan    Good recent improvement with dec pain with ROM and ER strengthening and less biceps pain.  Tolerated increased resistance with several ex.    Progress as nedra             Manual Therapy:    13     mins  95425;  Therapeutic Exercise:    36     mins  00420;         Timed Treatment:   49   mins   Total Treatment:     49   mins    Johanna Syed PT  Physical Therapist  [place] License #[id number]

## 2020-01-06 ENCOUNTER — TREATMENT (OUTPATIENT)
Dept: PHYSICAL THERAPY | Facility: CLINIC | Age: 60
End: 2020-01-06

## 2020-01-06 DIAGNOSIS — M25.511 ACUTE PAIN OF RIGHT SHOULDER: Primary | ICD-10-CM

## 2020-01-06 DIAGNOSIS — Z98.890 H/O ARTHROSCOPY OF SHOULDER: ICD-10-CM

## 2020-01-06 PROCEDURE — 97140 MANUAL THERAPY 1/> REGIONS: CPT | Performed by: PHYSICAL THERAPIST

## 2020-01-06 PROCEDURE — 97110 THERAPEUTIC EXERCISES: CPT | Performed by: PHYSICAL THERAPIST

## 2020-01-06 NOTE — PROGRESS NOTES
Physical Therapy Daily Progress Note      Visit # 25      Subjective   Thursday after last visit best day ever but Friday and Saturday bad.  Woke up feeling OK this morning.    Objective   See Exercise, Manual, and Modality Logs for complete treatment.       Assessment/Plan    Continues with intermittent impingement but improves after manual Rx.  Reported decreased pain at end of visit vs at beginning.    Progress strength/stability as nedra             Manual Therapy:    14     mins  80438;  Therapeutic Exercise:    38     mins  29368;         Timed Treatment:   52   mins   Total Treatment:     52   mins    Johanna Syed PT  Physical Therapist  [place] License #[id number]

## 2020-01-08 ENCOUNTER — TREATMENT (OUTPATIENT)
Dept: PHYSICAL THERAPY | Facility: CLINIC | Age: 60
End: 2020-01-08

## 2020-01-08 DIAGNOSIS — Z98.890 H/O ARTHROSCOPY OF SHOULDER: ICD-10-CM

## 2020-01-08 DIAGNOSIS — M25.511 ACUTE PAIN OF RIGHT SHOULDER: Primary | ICD-10-CM

## 2020-01-08 PROCEDURE — 97110 THERAPEUTIC EXERCISES: CPT | Performed by: PHYSICAL THERAPIST

## 2020-01-08 PROCEDURE — 97014 ELECTRIC STIMULATION THERAPY: CPT | Performed by: PHYSICAL THERAPIST

## 2020-01-08 PROCEDURE — 97140 MANUAL THERAPY 1/> REGIONS: CPT | Performed by: PHYSICAL THERAPIST

## 2020-01-08 NOTE — PROGRESS NOTES
Physical Therapy  Progress Note      1/8/2020  Valdo Gamboa MD    Re: Manfred Nunez  ________________________________________________________________    Mr. Manfred Nunez, has attended 26 PT sessions.  Treatment has consisted of: progressive there-ex, HEP, manual, modalities prn     S: Mr. Manfred Nunez states: woke up with pain and could barely raise arm - think I slept on it.  It's been up and down - good days and bad days        Subjective     Objective       Tenderness     Right Shoulder  Tenderness in the bicipital groove. No tenderness in the AC joint.     Additional Tenderness Details  mild    Active Range of Motion   Left Shoulder   Normal active range of motion    Right Shoulder   Flexion: 125 (some substitution) degrees   Abduction: 148 (notable effort) degrees   External rotation BTH: T2 (effort and pain)   Internal rotation BTB: T11     Passive Range of Motion     Right Shoulder   Flexion: 167 degrees   Abduction: 172 degrees   External rotation 90°: 62 degrees   Internal rotation 90°: 78 degrees     Strength/Myotome Testing     Right Shoulder     Planes of Motion   Flexion: 3- (pain)   Abduction: 4-   External rotation at 0°: 4- (pain)   Internal rotation at 0°: 5     Tests     Right Shoulder   Positive sulcus sign.   Negative drop arm.     Additional Tests Details  Mild pain with Empty Can and HK     See Exercise, Manual, and Modality Logs for complete treatment.       Assessment/Plan    Moderate fluctuation of sxs with good days and bad days.  Still with signs/sxs instability and intermittent impingement.  Overall improvement in ROM but still limited with pain and some substitution.  Tolerating gradual progression strengthening.  Needs continued work on strength/stability to prepare for safe return to normal ADLs    To MD             Manual Therapy:    13     mins  93223;  Therapeutic Exercise:    40     mins  10295;     E-stim                10   mins 29251    Timed Treatment:   53   mins    Total Treatment:     63   mins    Johanna Syed, PT  Physical Therapist

## 2020-01-10 ENCOUNTER — TREATMENT (OUTPATIENT)
Dept: PHYSICAL THERAPY | Facility: CLINIC | Age: 60
End: 2020-01-10

## 2020-01-10 DIAGNOSIS — M25.511 ACUTE PAIN OF RIGHT SHOULDER: Primary | ICD-10-CM

## 2020-01-10 DIAGNOSIS — Z98.890 H/O ARTHROSCOPY OF SHOULDER: ICD-10-CM

## 2020-01-10 PROCEDURE — 97140 MANUAL THERAPY 1/> REGIONS: CPT | Performed by: PHYSICAL THERAPIST

## 2020-01-10 PROCEDURE — 97110 THERAPEUTIC EXERCISES: CPT | Performed by: PHYSICAL THERAPIST

## 2020-01-10 NOTE — PROGRESS NOTES
Physical Therapy Daily Progress Note      Visit # 27      Subjective   MD said motion is better but not healed and will take 6 months from surgery.  Still having a lot of pain and difficulty raising arm at times.      Objective   See Exercise, Manual, and Modality Logs for complete treatment.       Assessment/Plan    ROM improved overall but still with intermittent pain and impingement.  Tends to push through pain and may be overdoing exercises at times.    Decrease resistance with scaption; add triceps             Manual Therapy:    13     mins  64288;  Therapeutic Exercise:    42     mins  01112;         Timed Treatment:   55   mins   Total Treatment:     55   mins    Johanna Syed PT  Physical Therapist  [place] License #[id number]

## 2020-01-15 ENCOUNTER — TREATMENT (OUTPATIENT)
Dept: PHYSICAL THERAPY | Facility: CLINIC | Age: 60
End: 2020-01-15

## 2020-01-15 DIAGNOSIS — M25.511 ACUTE PAIN OF RIGHT SHOULDER: Primary | ICD-10-CM

## 2020-01-15 DIAGNOSIS — Z98.890 H/O ARTHROSCOPY OF SHOULDER: ICD-10-CM

## 2020-01-15 PROCEDURE — 97110 THERAPEUTIC EXERCISES: CPT | Performed by: PHYSICAL THERAPIST

## 2020-01-15 PROCEDURE — 97140 MANUAL THERAPY 1/> REGIONS: CPT | Performed by: PHYSICAL THERAPIST

## 2020-01-15 NOTE — PROGRESS NOTES
Physical Therapy Daily Progress Note      Visit # 28      Subjective   About the same - no new problems    Objective   See Exercise, Manual, and Modality Logs for complete treatment.       Assessment/Plan    Less pain and impingement with PROM and tolerated several exercise changes/advancements except for pain after inferior capsule stretch.    Assess response to ex changes and proceed accordingly             Manual Therapy:    10     mins  41846;  Therapeutic Exercise:    44     mins  47695;         Timed Treatment:   54   mins   Total Treatment:     54   mins    Johanna Syed PT  Physical Therapist  [place] License #[id number]

## 2020-01-20 ENCOUNTER — TREATMENT (OUTPATIENT)
Dept: PHYSICAL THERAPY | Facility: CLINIC | Age: 60
End: 2020-01-20

## 2020-01-20 DIAGNOSIS — M25.511 ACUTE PAIN OF RIGHT SHOULDER: Primary | ICD-10-CM

## 2020-01-20 DIAGNOSIS — Z98.890 H/O ARTHROSCOPY OF SHOULDER: ICD-10-CM

## 2020-01-20 PROCEDURE — 97110 THERAPEUTIC EXERCISES: CPT | Performed by: PHYSICAL THERAPIST

## 2020-01-20 PROCEDURE — 97140 MANUAL THERAPY 1/> REGIONS: CPT | Performed by: PHYSICAL THERAPIST

## 2020-01-20 NOTE — PROGRESS NOTES
Physical Therapy Daily Progress Note      Visit # 29      Subjective   Thursday had a long drive and shoulder hurt after and the next day.  A little better on Saturday and a good day yesterday and so far today.    Objective   See Exercise, Manual, and Modality Logs for complete treatment.       Assessment/Plan    Continues with significant fluctuation with signs/sxs continual instability with intermittent impingement.  Needs continued gradual strengthening/stabilization    Progress ex as nedra             Manual Therapy:    11     mins  82195;  Therapeutic Exercise:    43     mins  91544;           Timed Treatment:   54   mins   Total Treatment:     54   mins    Johanna Syed PT  Physical Therapist  [place] License #[id number]

## 2020-01-22 ENCOUNTER — TREATMENT (OUTPATIENT)
Dept: PHYSICAL THERAPY | Facility: CLINIC | Age: 60
End: 2020-01-22

## 2020-01-22 DIAGNOSIS — M25.511 ACUTE PAIN OF RIGHT SHOULDER: Primary | ICD-10-CM

## 2020-01-22 DIAGNOSIS — Z98.890 H/O ARTHROSCOPY OF SHOULDER: ICD-10-CM

## 2020-01-22 PROCEDURE — 97110 THERAPEUTIC EXERCISES: CPT | Performed by: PHYSICAL THERAPIST

## 2020-01-22 PROCEDURE — 97014 ELECTRIC STIMULATION THERAPY: CPT | Performed by: PHYSICAL THERAPIST

## 2020-01-22 PROCEDURE — 97140 MANUAL THERAPY 1/> REGIONS: CPT | Performed by: PHYSICAL THERAPIST

## 2020-01-22 NOTE — PROGRESS NOTES
Physical Therapy Daily Progress Note      Visit # 30      Subjective   Felt really good after last session    Objective   See Exercise, Manual, and Modality Logs for complete treatment.       Assessment/Plan    Mild increase in after ex and some pain with PROM initially but improved with only mild pain with ER AFTER MANUAL.  Continues with intermittent impingement with instability but gradually improving    Continue with focus on stability and dec impingement             Manual Therapy:    12     mins  59379;  Therapeutic Exercise:    41     mins  69823;     Estim   15 min 08311      Timed Treatment:   53   mins   Total Treatment:     68   mins    Johanna Syed PT  Physical Therapist  [place] License #[id number]

## 2020-01-24 ENCOUNTER — TREATMENT (OUTPATIENT)
Dept: PHYSICAL THERAPY | Facility: CLINIC | Age: 60
End: 2020-01-24

## 2020-01-24 DIAGNOSIS — M25.511 ACUTE PAIN OF RIGHT SHOULDER: Primary | ICD-10-CM

## 2020-01-24 DIAGNOSIS — Z98.890 H/O ARTHROSCOPY OF SHOULDER: ICD-10-CM

## 2020-01-24 PROCEDURE — 97140 MANUAL THERAPY 1/> REGIONS: CPT | Performed by: PHYSICAL THERAPIST

## 2020-01-24 PROCEDURE — 97110 THERAPEUTIC EXERCISES: CPT | Performed by: PHYSICAL THERAPIST

## 2020-01-24 PROCEDURE — 97014 ELECTRIC STIMULATION THERAPY: CPT | Performed by: PHYSICAL THERAPIST

## 2020-01-24 NOTE — PROGRESS NOTES
Physical Therapy Daily Progress Note      Visit # 31      Subjective   Felt really good after last visit and the next day    Objective   See Exercise, Manual, and Modality Logs for complete treatment.       Assessment/Plan    Improvement in pain and impingement past couple of sessions.  Decreased resistance on scaption and horiz abd to improve quality and range of motion.  Tolerated progression of stretches    Continue to progress as nedra             Manual Therapy:    13     mins  74375;  Therapeutic Exercise:    43     mins  02802;     Estim   15 min 96898      Timed Treatment:   56   mins   Total Treatment:     71   mins    Johanna Syed PT  Physical Therapist  [place] License #[id number]

## 2020-01-27 ENCOUNTER — TREATMENT (OUTPATIENT)
Dept: PHYSICAL THERAPY | Facility: CLINIC | Age: 60
End: 2020-01-27

## 2020-01-27 DIAGNOSIS — Z98.890 H/O ARTHROSCOPY OF SHOULDER: ICD-10-CM

## 2020-01-27 DIAGNOSIS — M25.511 ACUTE PAIN OF RIGHT SHOULDER: Primary | ICD-10-CM

## 2020-01-27 PROCEDURE — 97140 MANUAL THERAPY 1/> REGIONS: CPT | Performed by: PHYSICAL THERAPIST

## 2020-01-27 PROCEDURE — 97110 THERAPEUTIC EXERCISES: CPT | Performed by: PHYSICAL THERAPIST

## 2020-01-27 NOTE — PROGRESS NOTES
Physical Therapy Daily Progress Note      Visit # 32      Subjective Evaluation    History of Present Illness    Subjective comment: Patient reports that he is doing pretty good.  He states that he has had 4 days of minimal pain.        Objective   See Exercise, Manual, and Modality Logs for complete treatment.       Assessment & Plan     Assessment  Assessment details: Patient tolerated treatment fairly well.  Flexion continues to improved however still limited in IR/ER. Overall functional strength improving.     Plan  Plan details: Progress as tolerated.                      Manual Therapy:    12     mins  94883;  Therapeutic Exercise:    40     mins  30873;     Neuromuscular Geronimo:    0    mins  72543;    Therapeutic Activity:     0     mins  59904;     Gait Trainin     mins  96804;     Ultrasound:     0     mins  54335;    Work Hardening           0      mins 53571  Iontophoresis               0   mins 09246    Timed Treatment:   52   mins   Total Treatment:     52   mins    Allie Villafuerte, PT  Physical Therapist

## 2020-01-29 ENCOUNTER — TREATMENT (OUTPATIENT)
Dept: PHYSICAL THERAPY | Facility: CLINIC | Age: 60
End: 2020-01-29

## 2020-01-29 DIAGNOSIS — Z98.890 H/O ARTHROSCOPY OF SHOULDER: ICD-10-CM

## 2020-01-29 DIAGNOSIS — M25.511 ACUTE PAIN OF RIGHT SHOULDER: Primary | ICD-10-CM

## 2020-01-29 PROCEDURE — 97110 THERAPEUTIC EXERCISES: CPT | Performed by: PHYSICAL THERAPIST

## 2020-01-29 PROCEDURE — 97140 MANUAL THERAPY 1/> REGIONS: CPT | Performed by: PHYSICAL THERAPIST

## 2020-01-29 NOTE — PROGRESS NOTES
Physical Therapy Daily Progress Note      Visit # 33      Subjective   Have had 5 good days in a row!    Objective   See Exercise, Manual, and Modality Logs for complete treatment.       Assessment/Plan    Continues to improve with dec signs/sxs impingement and instability.  Still with some EOR pain and tightness but decreasing and improves with manual.    Continue to progress strength gradually as nedra             Manual Therapy:    14     mins  68654;  Therapeutic Exercise:    44     mins  43809;         Timed Treatment:   58   mins   Total Treatment:     58   mins    Johanna Syed PT  Physical Therapist  [place] License #[id number]

## 2020-01-30 ENCOUNTER — TREATMENT (OUTPATIENT)
Dept: PHYSICAL THERAPY | Facility: CLINIC | Age: 60
End: 2020-01-30

## 2020-01-30 DIAGNOSIS — Z98.890 H/O ARTHROSCOPY OF SHOULDER: ICD-10-CM

## 2020-01-30 DIAGNOSIS — M25.511 ACUTE PAIN OF RIGHT SHOULDER: Primary | ICD-10-CM

## 2020-01-30 PROCEDURE — 97140 MANUAL THERAPY 1/> REGIONS: CPT | Performed by: PHYSICAL THERAPIST

## 2020-01-30 PROCEDURE — 97110 THERAPEUTIC EXERCISES: CPT | Performed by: PHYSICAL THERAPIST

## 2020-01-30 PROCEDURE — 97014 ELECTRIC STIMULATION THERAPY: CPT | Performed by: PHYSICAL THERAPIST

## 2020-01-30 NOTE — PROGRESS NOTES
Physical Therapy Daily Progress Note      Visit # 34      Subjective Evaluation    History of Present Illness    Subjective comment: Patient reports that he continues to have several good days in a row.        Objective   See Exercise, Manual, and Modality Logs for complete treatment.       Assessment & Plan     Assessment  Assessment details: Patient tolerated treatment well. His ROM continues to improve specifically in flexion and abduction.  ER still limited.     Plan  Plan details: Progress as tolerated.                      Manual Therapy:    12     mins  37753;  Therapeutic Exercise:    40     mins  95567;     Neuromuscular Geronimo:    0    mins  54121;    Therapeutic Activity:     0     mins  24937;     Gait Trainin     mins  50975;     Ultrasound:     0     mins  22509;    Work Hardening           0      mins 52074  Iontophoresis               0   mins 85258    Timed Treatment:   52   mins   Total Treatment:     67   mins    Allie Villafuerte, PT  Physical Therapist

## 2020-02-03 ENCOUNTER — TREATMENT (OUTPATIENT)
Dept: PHYSICAL THERAPY | Facility: CLINIC | Age: 60
End: 2020-02-03

## 2020-02-03 DIAGNOSIS — M25.511 ACUTE PAIN OF RIGHT SHOULDER: Primary | ICD-10-CM

## 2020-02-03 DIAGNOSIS — Z98.890 H/O ARTHROSCOPY OF SHOULDER: ICD-10-CM

## 2020-02-03 PROCEDURE — 97140 MANUAL THERAPY 1/> REGIONS: CPT | Performed by: PHYSICAL THERAPIST

## 2020-02-03 PROCEDURE — 97110 THERAPEUTIC EXERCISES: CPT | Performed by: PHYSICAL THERAPIST

## 2020-02-03 NOTE — PROGRESS NOTES
Physical Therapy Daily Progress Note      Visit # 35      Subjective   Saturday was not a good day but otherwise doing better    Objective   See Exercise, Manual, and Modality Logs for complete treatment.       Assessment/Plan    Less pain at end of session than on arrival.  STill with EOR tightness and intermittent pain - improves with manual.      Reassess for MD           Manual Therapy:    14     mins  33792;  Therapeutic Exercise:    45     mins  47913;       Estim   0 min 27159      Timed Treatment:   59   mins   Total Treatment:     59   mins    Johanna Syed PT  Physical Therapist  [place] License #[id number]

## 2020-02-05 ENCOUNTER — TREATMENT (OUTPATIENT)
Dept: PHYSICAL THERAPY | Facility: CLINIC | Age: 60
End: 2020-02-05

## 2020-02-05 DIAGNOSIS — M25.511 ACUTE PAIN OF RIGHT SHOULDER: Primary | ICD-10-CM

## 2020-02-05 DIAGNOSIS — Z98.890 H/O ARTHROSCOPY OF SHOULDER: ICD-10-CM

## 2020-02-05 PROCEDURE — 97110 THERAPEUTIC EXERCISES: CPT | Performed by: PHYSICAL THERAPIST

## 2020-02-05 PROCEDURE — 97140 MANUAL THERAPY 1/> REGIONS: CPT | Performed by: PHYSICAL THERAPIST

## 2020-02-05 NOTE — PROGRESS NOTES
Physical Therapy  Progress Note          2/5/2020  Valdo Gamboa MD    Re: Manfred Nunez  ________________________________________________________________    Mr. Manfred Nunez, has attended 36 PT sessions.  Treatment has consisted of: progressive  There-ex, HEP, manual, modalities prn and pt ed    S: Mr. Manfred Nunez states: feel like I'm doing better but still a ways to go to get back to work.  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      Subjective     Objective       Tenderness     Right Shoulder  Tenderness in the bicipital groove.     Active Range of Motion   Left Shoulder   Normal active range of motion    Right Shoulder   Flexion: 148 (mild substitution) degrees   Abduction: 155 (some effort but improved) degrees   External rotation BTH: T2   Internal rotation BTB: T11     Passive Range of Motion     Right Shoulder   Flexion: 170 degrees   Abduction: 172 degrees   External rotation 90°: 70 degrees   Internal rotation 90°: 85 degrees     Strength/Myotome Testing     Right Shoulder     Planes of Motion   Flexion: 4- (pain)   Abduction: 4- (pain inhibited)   External rotation at 0°: 4+ (pain)   Internal rotation at 0°: 5     Tests     Right Shoulder   Negative drop arm and sulcus sign.     Additional Tests Details  Mild pain with Empty Can and HK     See Exercise, Manual, and Modality Logs for complete treatment.       Assessment/Plan    Good recent improvement with increased mobility and strength and decreased impingement.  Still though with intermittent impingement, weakness and limited nedra for prolonged use of RUE (required by job).  Could benfit from continued PT to further strengthen and prepare for return to normal ADLs    To MD               Manual Therapy:    12     mins  71750;  Therapeutic Exercise:    45     mins  78573;         Timed Treatment:   57   mins   Total Treatment:     57   mins    Johanna Syed PT  Physical Therapist

## 2020-02-10 ENCOUNTER — TREATMENT (OUTPATIENT)
Dept: PHYSICAL THERAPY | Facility: CLINIC | Age: 60
End: 2020-02-10

## 2020-02-10 DIAGNOSIS — Z98.890 H/O ARTHROSCOPY OF SHOULDER: ICD-10-CM

## 2020-02-10 DIAGNOSIS — M25.511 ACUTE PAIN OF RIGHT SHOULDER: Primary | ICD-10-CM

## 2020-02-10 PROCEDURE — 97140 MANUAL THERAPY 1/> REGIONS: CPT | Performed by: PHYSICAL THERAPIST

## 2020-02-10 PROCEDURE — 97110 THERAPEUTIC EXERCISES: CPT | Performed by: PHYSICAL THERAPIST

## 2020-02-10 NOTE — PROGRESS NOTES
Physical Therapy Daily Progress Note    Manfred Mayra  Visit # 37      Subjective   MD said doing better and to continue same until RTO.  Did fine over the weekend except pain on Saturday - but woek up fine Sunday morning.    Objective   See Exercise, Manual, and Modality Logs for complete treatment.     LEFS score 36%    Assessment/Plan    Still weakness with ER and pure abduction but improving overall.  Tolerated light progression of ex.    Continue to progress strength as nedra and dec frequency of PT to BIW.  Manual and there-ex/act as nedra.    Updated goals til RTO in 5 wks:    1.  All AROM WFL with min to no pain and substitution  2.  All strength 4/5 or greater  3. Pain with normal ADLs no > 3/10  4. Demos tolerance for return to all normal ADLs including work  5. Negative impingement testing                     Manual Therapy:    14     mins  30986;  Therapeutic Exercise:    48     mins  28262;         Timed Treatment:   62   mins   Total Treatment:     62   mins    Johanna Syed PT  Physical Therapist  [place] License #[id number]

## 2020-02-13 ENCOUNTER — TREATMENT (OUTPATIENT)
Dept: PHYSICAL THERAPY | Facility: CLINIC | Age: 60
End: 2020-02-13

## 2020-02-13 DIAGNOSIS — M25.511 ACUTE PAIN OF RIGHT SHOULDER: Primary | ICD-10-CM

## 2020-02-13 DIAGNOSIS — Z98.890 H/O ARTHROSCOPY OF SHOULDER: ICD-10-CM

## 2020-02-13 PROCEDURE — 97110 THERAPEUTIC EXERCISES: CPT | Performed by: PHYSICAL THERAPIST

## 2020-02-13 PROCEDURE — 97140 MANUAL THERAPY 1/> REGIONS: CPT | Performed by: PHYSICAL THERAPIST

## 2020-02-13 NOTE — PROGRESS NOTES
"Physical Therapy Daily Progress Note      Visit # 38      Subjective Evaluation    History of Present Illness    Subjective comment: Patient reports that he continues to have several good days in a row which is much better than he was previously experiencing. \"Im still no where ready to return to work.\"       Objective   See Exercise, Manual, and Modality Logs for complete treatment.       Assessment & Plan     Assessment  Assessment details: Patient tolerated treatment over all pretty well.  He was able to progress in weight his prone activities with minimal difficulty.     Plan  Plan details: Progress as tolerated.                      Manual Therapy:    10     mins  44951;  Therapeutic Exercise:    50     mins  92086;     Neuromuscular Geronimo:    0    mins  07064;    Therapeutic Activity:     0     mins  67034;     Gait Trainin     mins  69418;     Ultrasound:     0     mins  75728;    Work Hardening           0      mins 92131  Iontophoresis               0   mins 50592    Timed Treatment:   60   mins   Total Treatment:     60   mins    Allie Villafuerte, PT  Physical Therapist  "

## 2020-02-17 ENCOUNTER — TREATMENT (OUTPATIENT)
Dept: PHYSICAL THERAPY | Facility: CLINIC | Age: 60
End: 2020-02-17

## 2020-02-17 DIAGNOSIS — M25.511 ACUTE PAIN OF RIGHT SHOULDER: Primary | ICD-10-CM

## 2020-02-17 DIAGNOSIS — Z98.890 H/O ARTHROSCOPY OF SHOULDER: ICD-10-CM

## 2020-02-17 PROCEDURE — 97140 MANUAL THERAPY 1/> REGIONS: CPT | Performed by: PHYSICAL THERAPIST

## 2020-02-17 PROCEDURE — 97110 THERAPEUTIC EXERCISES: CPT | Performed by: PHYSICAL THERAPIST

## 2020-02-17 NOTE — PROGRESS NOTES
Physical Therapy Daily Progress Note      Visit # 39      Subjective   Had a bad day Friday and Saturday but good yesterday and today.    Objective   See Exercise, Manual, and Modality Logs for complete treatment.       Assessment/Plan    Continues with fluctuating pain with intermittent impingement.  EOR pain but improves with manual stretching/mobs.  Still with mod weakness but gradually improving    Progress strengthening as nedra             Manual Therapy:    13     mins  88165;  Therapeutic Exercise:    42     mins  68625;         Timed Treatment:   55   mins   Total Treatment:     55   mins    Johanna Syed PT  Physical Therapist  [place] License #[id number]

## 2020-02-20 ENCOUNTER — TREATMENT (OUTPATIENT)
Dept: PHYSICAL THERAPY | Facility: CLINIC | Age: 60
End: 2020-02-20

## 2020-02-20 DIAGNOSIS — Z98.890 H/O ARTHROSCOPY OF SHOULDER: ICD-10-CM

## 2020-02-20 DIAGNOSIS — M25.511 ACUTE PAIN OF RIGHT SHOULDER: Primary | ICD-10-CM

## 2020-02-20 PROCEDURE — 97110 THERAPEUTIC EXERCISES: CPT | Performed by: PHYSICAL THERAPIST

## 2020-02-20 PROCEDURE — 97140 MANUAL THERAPY 1/> REGIONS: CPT | Performed by: PHYSICAL THERAPIST

## 2020-02-20 NOTE — PROGRESS NOTES
Physical Therapy Daily Progress Note      Visit # 40      Subjective   Tuesday was a bad day with sharp pain shoulder to elbow but Wednesday and today better.    Objective   See Exercise, Manual, and Modality Logs for complete treatment.       Assessment/Plan    Continues with fluctuating pain with intense at times but overall improving.  Challenged with chest press once corrected for proper form.  Has been doing strengthening daily at home - recommended to decrease to every other day but continue stretches and ROM daily due to staying inflammed. Still with intermittent impingement - improves with manual.    Continue to progress strength as nedra - increase band resistance?         Manual Therapy:    14     mins  96949;  Therapeutic Exercise:    40     mins  76400;           Timed Treatment:   54   mins   Total Treatment:     54   mins    Johanna Syed PT  Physical Therapist  [place] License #[id number]

## 2020-02-25 ENCOUNTER — TREATMENT (OUTPATIENT)
Dept: PHYSICAL THERAPY | Facility: CLINIC | Age: 60
End: 2020-02-25

## 2020-02-25 DIAGNOSIS — M25.511 ACUTE PAIN OF RIGHT SHOULDER: Primary | ICD-10-CM

## 2020-02-25 DIAGNOSIS — Z98.890 H/O ARTHROSCOPY OF SHOULDER: ICD-10-CM

## 2020-02-25 PROCEDURE — 97110 THERAPEUTIC EXERCISES: CPT | Performed by: PHYSICAL THERAPIST

## 2020-02-25 PROCEDURE — 97140 MANUAL THERAPY 1/> REGIONS: CPT | Performed by: PHYSICAL THERAPIST

## 2020-02-25 NOTE — PROGRESS NOTES
Physical Therapy Daily Progress Note      Visit # 41      Subjective   Calmed down over the weekend with less resistance ex and doing mostly just the stretches    Objective   See Exercise, Manual, and Modality Logs for complete treatment.       Assessment/Plan    Good response to dec frequency of strengthening with sings/sxs decreased inflammation and impingement.    Progress resistance as tolerated             Manual Therapy:    12     mins  57848;  Therapeutic Exercise:    35     mins  59919;         Timed Treatment:   47   mins   Total Treatment:     47   mins    Johanna Syed PT  Physical Therapist  [place] License #[id number]

## 2020-02-27 ENCOUNTER — TREATMENT (OUTPATIENT)
Dept: PHYSICAL THERAPY | Facility: CLINIC | Age: 60
End: 2020-02-27

## 2020-02-27 DIAGNOSIS — Z98.890 H/O ARTHROSCOPY OF SHOULDER: ICD-10-CM

## 2020-02-27 DIAGNOSIS — M25.511 ACUTE PAIN OF RIGHT SHOULDER: Primary | ICD-10-CM

## 2020-02-27 PROCEDURE — 97140 MANUAL THERAPY 1/> REGIONS: CPT | Performed by: PHYSICAL THERAPIST

## 2020-02-27 PROCEDURE — 97110 THERAPEUTIC EXERCISES: CPT | Performed by: PHYSICAL THERAPIST

## 2020-02-27 NOTE — PROGRESS NOTES
Physical Therapy Daily Progress Note      Visit # 42      Subjective   Good - just a little soreness after last visit    Objective   See Exercise, Manual, and Modality Logs for complete treatment.       Assessment/Plan    Good response to changes in HEP.  Tends to speed through ex - needs to slow down.Still with some mild intermittent impingement but dec today and responds well to mobs.    Progress strength/stability as nedra               Manual Therapy:    11     mins  19669;  Therapeutic Exercise:    33     mins  94435;           Timed Treatment:   44   mins   Total Treatment:     44   mins    Johanna Syed PT  Physical Therapist  [place] License #[id number]

## 2020-03-02 ENCOUNTER — TREATMENT (OUTPATIENT)
Dept: PHYSICAL THERAPY | Facility: CLINIC | Age: 60
End: 2020-03-02

## 2020-03-02 DIAGNOSIS — Z98.890 H/O ARTHROSCOPY OF SHOULDER: ICD-10-CM

## 2020-03-02 DIAGNOSIS — M25.511 ACUTE PAIN OF RIGHT SHOULDER: Primary | ICD-10-CM

## 2020-03-02 PROCEDURE — 97110 THERAPEUTIC EXERCISES: CPT | Performed by: PHYSICAL THERAPIST

## 2020-03-02 NOTE — PROGRESS NOTES
Physical Therapy Daily Progress Note      Visit # 43      Subjective   Sunday was a little rough but today is better    Objective   See Exercise, Manual, and Modality Logs for complete treatment.       Assessment/Plan    Continues with gradual improvement with increasing ROM and strength and dec c/p pain. Still with intermittent impingement but improving as well.    Progress strengthening as nedra             Manual Therapy:    5     mins  63849;  Therapeutic Exercise:    43     mins  44470;         Timed Treatment:   48   mins   Total Treatment:     48   mins    Johanna Syed PT  Physical Therapist  [place] License #[id number]

## 2020-03-11 ENCOUNTER — TREATMENT (OUTPATIENT)
Dept: PHYSICAL THERAPY | Facility: CLINIC | Age: 60
End: 2020-03-11

## 2020-03-11 DIAGNOSIS — Z98.890 H/O ARTHROSCOPY OF SHOULDER: ICD-10-CM

## 2020-03-11 DIAGNOSIS — M25.511 ACUTE PAIN OF RIGHT SHOULDER: Primary | ICD-10-CM

## 2020-03-11 PROCEDURE — 97140 MANUAL THERAPY 1/> REGIONS: CPT | Performed by: PHYSICAL THERAPIST

## 2020-03-11 PROCEDURE — 97110 THERAPEUTIC EXERCISES: CPT | Performed by: PHYSICAL THERAPIST

## 2020-03-11 NOTE — PROGRESS NOTES
Physical Therapy  Progress Note        3/11/2020  Valdo Gamboa MD    Re: Manfred Nunez  ________________________________________________________________    Mr. Manfred Nunez, has attended 44 PT sessions.  Treatment has consisted of: progressive ther-ex/act, HEP, manual and modalities prn     S: Mr. Manfred Nunez states: About the same - still up and down. Anti-inflammatories do help.      Subjective     Objective       Tenderness     Right Shoulder  No tenderness     Active Range of Motion   Left Shoulder   Normal active range of motion    Right Shoulder   Flexion: 152 (mild substitution) degrees   Abduction: 172 (some effort but improved) degrees   External rotation BTH: T2 with pain  Internal rotation BTB: T11     Passive Range of Motion     Right Shoulder   Flexion: 170 degrees   Abduction: 175 degrees   External rotation 90°: 72 degrees   Internal rotation 90°: 88 degrees     Strength/Myotome Testing     Right Shoulder     Planes of Motion   Flexion: 4- (pain)   Abduction: 4- (pain inhibited)   External rotation at 0°: 4+   Internal rotation at 0°: 5     Tests     Right Shoulder   Positive empty can.   Negative drop arm and sulcus sign.     Additional Tests Details  Mild pain with HK    Functional Assessment     Comments  Tolerates 2-handed lift of 25 lbs knees to waist (says job requires only occasional lifting)     See Exercise, Manual, and Modality Logs for complete treatment.       Assessment/Plan    No significant change since last update.  Pain fluctuates moderately but with more good days though than previously.  ROM is functional but still painful at times and with some substitutiion.  Strength with arms away from body is still poor.        To MD           Manual Therapy:    12     mins  06467;  Therapeutic Exercise:    20     mins  28398;       Timed Treatment:   32   mins   Total Treatment:     32   mins    Johanna Syed PT  Physical Therapist

## 2021-03-22 ENCOUNTER — BULK ORDERING (OUTPATIENT)
Dept: CASE MANAGEMENT | Facility: OTHER | Age: 61
End: 2021-03-22

## 2021-03-22 DIAGNOSIS — Z23 IMMUNIZATION DUE: ICD-10-CM

## 2023-10-18 ENCOUNTER — OFFICE (AMBULATORY)
Dept: URBAN - METROPOLITAN AREA CLINIC 76 | Facility: CLINIC | Age: 63
End: 2023-10-18

## 2023-10-18 VITALS
HEART RATE: 68 BPM | SYSTOLIC BLOOD PRESSURE: 125 MMHG | WEIGHT: 250 LBS | HEIGHT: 73 IN | OXYGEN SATURATION: 98 % | DIASTOLIC BLOOD PRESSURE: 85 MMHG

## 2023-10-18 DIAGNOSIS — K21.9 GASTRO-ESOPHAGEAL REFLUX DISEASE WITHOUT ESOPHAGITIS: ICD-10-CM

## 2023-10-18 DIAGNOSIS — R13.10 DYSPHAGIA, UNSPECIFIED: ICD-10-CM

## 2023-10-18 PROCEDURE — 99204 OFFICE O/P NEW MOD 45 MIN: CPT | Performed by: INTERNAL MEDICINE

## 2023-10-18 RX ORDER — PANTOPRAZOLE 40 MG/1
TABLET, DELAYED RELEASE ORAL
Qty: 180 | Refills: 3 | Status: ACTIVE
Start: 2023-10-18

## 2023-12-19 VITALS
TEMPERATURE: 97.1 F | OXYGEN SATURATION: 95 % | SYSTOLIC BLOOD PRESSURE: 109 MMHG | SYSTOLIC BLOOD PRESSURE: 129 MMHG | HEART RATE: 67 BPM | HEART RATE: 55 BPM | OXYGEN SATURATION: 99 % | SYSTOLIC BLOOD PRESSURE: 118 MMHG | RESPIRATION RATE: 19 BRPM | TEMPERATURE: 98.9 F | DIASTOLIC BLOOD PRESSURE: 109 MMHG | RESPIRATION RATE: 18 BRPM | RESPIRATION RATE: 22 BRPM | DIASTOLIC BLOOD PRESSURE: 85 MMHG | HEIGHT: 73 IN | SYSTOLIC BLOOD PRESSURE: 111 MMHG | HEART RATE: 65 BPM | DIASTOLIC BLOOD PRESSURE: 79 MMHG | HEART RATE: 69 BPM | HEART RATE: 59 BPM | OXYGEN SATURATION: 96 % | OXYGEN SATURATION: 97 % | SYSTOLIC BLOOD PRESSURE: 100 MMHG | OXYGEN SATURATION: 93 % | DIASTOLIC BLOOD PRESSURE: 65 MMHG | OXYGEN SATURATION: 92 % | DIASTOLIC BLOOD PRESSURE: 81 MMHG | DIASTOLIC BLOOD PRESSURE: 64 MMHG | HEART RATE: 63 BPM | RESPIRATION RATE: 7 BRPM | SYSTOLIC BLOOD PRESSURE: 149 MMHG | WEIGHT: 250 LBS | DIASTOLIC BLOOD PRESSURE: 60 MMHG | DIASTOLIC BLOOD PRESSURE: 72 MMHG

## 2023-12-21 ENCOUNTER — OFFICE (AMBULATORY)
Dept: URBAN - METROPOLITAN AREA PATHOLOGY 4 | Facility: PATHOLOGY | Age: 63
End: 2023-12-21

## 2023-12-21 ENCOUNTER — AMBULATORY SURGICAL CENTER (AMBULATORY)
Dept: URBAN - METROPOLITAN AREA SURGERY 17 | Facility: SURGERY | Age: 63
End: 2023-12-21

## 2023-12-21 DIAGNOSIS — K44.9 DIAPHRAGMATIC HERNIA WITHOUT OBSTRUCTION OR GANGRENE: ICD-10-CM

## 2023-12-21 DIAGNOSIS — K29.70 GASTRITIS, UNSPECIFIED, WITHOUT BLEEDING: ICD-10-CM

## 2023-12-21 DIAGNOSIS — K31.89 OTHER DISEASES OF STOMACH AND DUODENUM: ICD-10-CM

## 2023-12-21 DIAGNOSIS — K21.9 GASTRO-ESOPHAGEAL REFLUX DISEASE WITHOUT ESOPHAGITIS: ICD-10-CM

## 2023-12-21 DIAGNOSIS — R13.10 DYSPHAGIA, UNSPECIFIED: ICD-10-CM

## 2023-12-21 LAB
GI HISTOLOGY: A. UNSPECIFIED: (no result)
GI HISTOLOGY: B. SELECT: (no result)
GI HISTOLOGY: C. UNSPECIFIED: (no result)
GI HISTOLOGY: D. UNSPECIFIED: (no result)
GI HISTOLOGY: PDF REPORT: (no result)

## 2023-12-21 PROCEDURE — 43239 EGD BIOPSY SINGLE/MULTIPLE: CPT | Mod: 33 | Performed by: INTERNAL MEDICINE

## 2023-12-21 PROCEDURE — 43450 DILATE ESOPHAGUS 1/MULT PASS: CPT | Performed by: INTERNAL MEDICINE

## 2023-12-21 PROCEDURE — 88342 IMHCHEM/IMCYTCHM 1ST ANTB: CPT | Performed by: INTERNAL MEDICINE

## 2023-12-21 PROCEDURE — 88305 TISSUE EXAM BY PATHOLOGIST: CPT | Performed by: INTERNAL MEDICINE

## 2024-02-02 ENCOUNTER — TELEPHONE (OUTPATIENT)
Dept: PAIN MEDICINE | Facility: CLINIC | Age: 64
End: 2024-02-02
Payer: COMMERCIAL

## 2024-02-05 ENCOUNTER — PREP FOR SURGERY (OUTPATIENT)
Dept: SURGERY | Facility: SURGERY CENTER | Age: 64
End: 2024-02-05
Payer: COMMERCIAL

## 2024-02-05 ENCOUNTER — OFFICE VISIT (OUTPATIENT)
Dept: PAIN MEDICINE | Facility: CLINIC | Age: 64
End: 2024-02-05
Payer: COMMERCIAL

## 2024-02-05 VITALS
DIASTOLIC BLOOD PRESSURE: 90 MMHG | HEIGHT: 72 IN | SYSTOLIC BLOOD PRESSURE: 114 MMHG | BODY MASS INDEX: 33.78 KG/M2 | WEIGHT: 249.4 LBS | HEART RATE: 84 BPM | OXYGEN SATURATION: 98 % | TEMPERATURE: 97.1 F | RESPIRATION RATE: 18 BRPM

## 2024-02-05 DIAGNOSIS — M50.30 DDD (DEGENERATIVE DISC DISEASE), CERVICAL: ICD-10-CM

## 2024-02-05 DIAGNOSIS — M48.02 CERVICAL SPINAL STENOSIS: ICD-10-CM

## 2024-02-05 DIAGNOSIS — M47.812 ARTHROPATHY OF CERVICAL FACET JOINT: Primary | ICD-10-CM

## 2024-02-05 PROCEDURE — 99204 OFFICE O/P NEW MOD 45 MIN: CPT | Performed by: PHYSICIAN ASSISTANT

## 2024-02-05 RX ORDER — PANTOPRAZOLE SODIUM 40 MG/1
1 TABLET, DELAYED RELEASE ORAL EVERY MORNING
COMMUNITY
Start: 2023-10-18

## 2024-02-05 RX ORDER — DOXYCYCLINE 50 MG/1
TABLET ORAL
COMMUNITY

## 2024-02-05 RX ORDER — MELOXICAM 15 MG/1
1 TABLET ORAL DAILY PRN
COMMUNITY
Start: 2023-12-28

## 2024-02-05 RX ORDER — DIAZEPAM 5 MG/1
10 TABLET ORAL ONCE
OUTPATIENT
Start: 2024-02-05 | End: 2024-02-05

## 2024-02-05 RX ORDER — ZOLPIDEM TARTRATE 10 MG/1
TABLET ORAL
COMMUNITY

## 2024-02-05 RX ORDER — SIMVASTATIN 40 MG
1 TABLET ORAL DAILY
COMMUNITY
Start: 2023-12-26

## 2024-02-05 RX ORDER — AMLODIPINE BESYLATE 10 MG/1
1 TABLET ORAL DAILY
COMMUNITY
Start: 2023-12-26

## 2024-02-05 RX ORDER — LOSARTAN POTASSIUM 100 MG/1
1 TABLET ORAL DAILY
COMMUNITY
Start: 2023-12-26

## 2024-02-05 RX ORDER — ERGOCALCIFEROL 1.25 MG/1
CAPSULE ORAL
COMMUNITY

## 2024-02-05 NOTE — PROGRESS NOTES
CHIEF COMPLAINT  Neck pain  Left shoulder pain    Subjective   Manfred Nunez is a 64 y.o. male.   He presents to the office for initial evaluation of CERVICAL SPINE . He was referred here by Isai Shepard DC .  This patient reports onset of neck pain approximately 4 years ago without precipitating trauma or injury.  He states that he is employed as a  and because he has to look backwards while driving forwards this tends to aggravate his pain.  This patient illustrates primarily left-sided lumbosacral spine pain with a pulling and burning sensation that radiates along the superior aspect of the trapezius on the left and he experiences very rare pain radiating into the left upper extremity with concomitant numbness.  His primary pain is the neck pain which can be very sharp, aching and burning in sensation.     The patient denies any history of cervical or lumbar spine surgery.  He has had bilateral knee arthroplasties as well as a total of 4 surgeries on the right shoulder which included labral tear the first time and subsequent surgeries for rotator cuff/labrum, biceps tendon repairs.    Patient has a history of undergoing LESI for back pain numerous years ago which she believes provided some relief.    He goes to a chiropractor for manipulation and exercises twice monthly.  He has been provided with a physician guided home exercise program which she has performed without relief of symptoms.  Patient utilizes heat therapy every day as well as alternating with ice therapy occasionally.    Pain today 4/10 VAS in severity.      Neck Pain   This is a chronic problem. The current episode started more than 1 year ago. The problem occurs constantly. The problem has been gradually worsening. The pain is associated with nothing. The pain is present in the left side. The quality of the pain is described as burning and aching (Sharp). The pain is at a severity of 4/10. The pain is moderate. The  symptoms are aggravated by position. The pain is Worse during the day. Stiffness is present All day. Associated symptoms include numbness. Pertinent negatives include no headaches or weakness.        PEG Assessment   What number best describes your pain on average in the past week?6  What number best describes how, during the past week, pain has interfered with your enjoyment of life?4  What number best describes how, during the past week, pain has interfered with your general activity?  4        Current Outpatient Medications:     amLODIPine (NORVASC) 10 MG tablet, Take 1 tablet by mouth Daily., Disp: , Rfl:     doxycycline (ADOXA) 50 MG tablet, TAKE 1 TABLET BY MOUTH EVERY DAY FOR ACNE ROSACEA, Disp: , Rfl:     ergocalciferol (ERGOCALCIFEROL) 1.25 MG (83435 UT) capsule, ergocalciferol (vitamin D2) 1,250 mcg (50,000 unit) capsule  TAKE 1 CAPSULE BY MOUTH EVERY WEEK AS DIRECTED, Disp: , Rfl:     losartan (COZAAR) 100 MG tablet, Take 1 tablet by mouth Daily., Disp: , Rfl:     meloxicam (MOBIC) 15 MG tablet, Take 1 tablet by mouth Daily As Needed. prn, Disp: , Rfl:     pantoprazole (PROTONIX) 40 MG EC tablet, Take 1 tablet by mouth Every Morning., Disp: , Rfl:     simvastatin (ZOCOR) 40 MG tablet, Take 1 tablet by mouth Daily., Disp: , Rfl:     zolpidem (AMBIEN) 10 MG tablet, prn, Disp: , Rfl:     The following portions of the patient's history were reviewed and updated as appropriate: allergies, current medications, past family history, past medical history, past social history, past surgical history, and problem list.      REVIEW OF PERTINENT MEDICAL DATA                Review of Systems   Constitutional:  Negative for activity change and fatigue.   Gastrointestinal:  Negative for abdominal pain, constipation and diarrhea.   Genitourinary:  Negative for difficulty urinating and dysuria.   Musculoskeletal:  Positive for neck pain.   Neurological:  Positive for numbness. Negative for dizziness, weakness,  "light-headedness and headaches.   Psychiatric/Behavioral:  Negative for agitation, sleep disturbance and suicidal ideas. The patient is not nervous/anxious.        I have reviewed and confirmed the accuracy of the ROS as documented by the MA/LPN/RN MEKA Butterfield    Vitals:    02/05/24 1008   BP: 114/90   BP Location: Left arm   Patient Position: Sitting   Cuff Size: Large Adult   Pulse: 84   Resp: 18   Temp: 97.1 °F (36.2 °C)   SpO2: 98%   Weight: 113 kg (249 lb 6.4 oz)   Height: 184 cm (72.44\")   PainSc:   4         Objective       Physical Exam  Vitals and nursing note reviewed.   Constitutional:       Appearance: Normal appearance. He is normal weight.   HENT:      Head: Normocephalic.   Pulmonary:      Effort: Pulmonary effort is normal.   Musculoskeletal:      Cervical back: Tenderness (EXQUISITE PAIN TO PALPATION OVER THE LEFT CERVICAL FACET JOINT SPACES) present. Pain with movement (PAIN WITH ROM IN ALL PLANES AND ALSO NOTED WITH RIGHT CEVICAL ROTATION), spinous process tenderness and muscular tenderness present. Decreased range of motion.        Back:    Skin:     General: Skin is warm and dry.   Neurological:      General: No focal deficit present.      Mental Status: He is alert and oriented to person, place, and time.      Cranial Nerves: Cranial nerves 2-12 are intact.      Sensory: Sensation is intact.      Motor: Motor function is intact.      Gait: Gait is intact.      Deep Tendon Reflexes:      Reflex Scores:       Tricep reflexes are 2+ on the right side and 2+ on the left side.       Bicep reflexes are 2+ on the right side and 2+ on the left side.       Brachioradialis reflexes are 2+ on the right side and 2+ on the left side.  Psychiatric:         Mood and Affect: Mood normal.         Behavior: Behavior normal.         Thought Content: Thought content normal.         Judgment: Judgment normal.         Assessment & Plan   Diagnoses and all orders for this visit:    1. Arthropathy of cervical " facet joint (Primary)    2. DDD (degenerative disc disease), cervical    3. Cervical spinal stenosis        --- Manfred Nunez reports a pain score of 4.  Given his pain assessment as noted, treatment options were discussed and the following options were decided upon as a follow-up plan to address the patient's pain: educational materials on pain management and use of non-medical modalities (ice, heat, stretching and/or behavior modifications).    --- Based on the patient's physical exam and MRI findings I recommend proceeding with #1 diagnostic left C3-5 cervical medial branch block to proceed to radiofrequency ablation based on favorable response.  The risk and benefits of the procedure have been discussed with the patient and all questions have been addressed.  --- Follow-up 1 week after injective therapy for further evaluation and treat recommendations to be made    Pain / Disability Scale    The scale used for measurement of pain and/or disability for this patient was the [place] back pain disability scale.  The score was 14 on 02/05/2024       Diagnostic Facet Joint Procedure:   MBB     The first diagnostic facet joint procedure is considered medically reasonable and necessary for the diagnosis and treatment of chronic pain for this patient due to the patient meeting all of the following criteria:    - 1. Moderate to severe chronic neck or low back pain, predominantly axial, that causes functional deficit measured on pain or disability scale.  - 2. Pain present for minimum of 3 months with documented failure to respond to noninvasive conservative management (as tolerated)  - 3. Absence of untreated radiculopathy or neurogenic claudication (except for radiculopathy caused by facet joint synovial cyst)  - 4. There is no non-facet pathology per clinical assessment or radiology studies that could explain the source of the patient’s pain, including but not limited to fracture, tumor, infection, or significant  deformity.    The confirmatory diagnostic facet joint procedure is considered medically reasonable and necessary for the diagnosis and treatment of chronic pain for this patient due to the patient meeting all of the following criteria:    - 1. Moderate to severe chronic neck or low back pain, predominantly axial, that causes functional deficit measured on pain or disability scale.  - 2. Pain present for minimum of 3 months with documented failure to respond to noninvasive conservative management (as tolerated)  - 3. Absence of untreated radiculopathy or neurogenic claudication (except for radiculopathy caused by facet joint synovial cyst)  - 4. There is no non-facet pathology per clinical assessment or radiology studies that could explain the source of the patient’s pain, including but not limited to fracture, tumor, infection, or significant deformity.    The patient has also shown a consistent positive response of at least 80% relief of primary (index) pain (with the duration of relief being consistent with the agent used).     TANA REPORT  TANA report has been reviewed and scanned into the patient's chart.    As the clinician, I personally reviewed the TANA from 2/5/2024 while the patient was in the office today.        Dictated utilizing Dragon dictation.

## 2024-02-15 DIAGNOSIS — M47.812 ARTHROPATHY OF CERVICAL FACET JOINT: Primary | ICD-10-CM

## 2024-02-22 ENCOUNTER — TRANSCRIBE ORDERS (OUTPATIENT)
Dept: SURGERY | Facility: SURGERY CENTER | Age: 64
End: 2024-02-22
Payer: COMMERCIAL

## 2024-02-22 DIAGNOSIS — Z41.9 SURGERY, ELECTIVE: Primary | ICD-10-CM

## 2024-03-08 ENCOUNTER — TELEPHONE (OUTPATIENT)
Dept: PAIN MEDICINE | Facility: CLINIC | Age: 64
End: 2024-03-08

## 2024-03-08 NOTE — TELEPHONE ENCOUNTER
Caller: Manfred Nunez    Relationship to patient: Self    Best call back number: [phone] (home)     Chief complaint: CERVICAL MEDIAL BRANCH BLOCK LEFT C3-5 #1     Type of visit: CERVICAL MEDIAL BRANCH BLOCK LEFT C3-5 #1     Requested date: AFTER 4/11/24 INJECTION    If rescheduling, when is the original appointment: 3/13/24     Additional notes: PLEASE CALL BEFORE 1PM - PT CANNOT ANSWER THE PHONE AT WORK

## 2024-03-11 ENCOUNTER — TRANSCRIBE ORDERS (OUTPATIENT)
Dept: SURGERY | Facility: SURGERY CENTER | Age: 64
End: 2024-03-11
Payer: COMMERCIAL

## 2024-03-11 DIAGNOSIS — Z41.9 SURGERY, ELECTIVE: Primary | ICD-10-CM

## 2024-03-22 ENCOUNTER — OFFICE (AMBULATORY)
Dept: URBAN - METROPOLITAN AREA CLINIC 76 | Facility: CLINIC | Age: 64
End: 2024-03-22

## 2024-03-22 VITALS
OXYGEN SATURATION: 96 % | DIASTOLIC BLOOD PRESSURE: 78 MMHG | SYSTOLIC BLOOD PRESSURE: 118 MMHG | HEIGHT: 73 IN | HEART RATE: 66 BPM | WEIGHT: 251 LBS

## 2024-03-22 DIAGNOSIS — K21.9 GASTRO-ESOPHAGEAL REFLUX DISEASE WITHOUT ESOPHAGITIS: ICD-10-CM

## 2024-03-22 PROCEDURE — 99214 OFFICE O/P EST MOD 30 MIN: CPT | Performed by: INTERNAL MEDICINE

## 2024-03-22 RX ORDER — PANTOPRAZOLE 40 MG/1
TABLET, DELAYED RELEASE ORAL
Qty: 180 | Refills: 3 | Status: ACTIVE
Start: 2023-10-18

## 2024-04-10 NOTE — SIGNIFICANT NOTE
Patient educated on the following :    - If you are receiving Sedation for your procedure Nothing to Eat 6 hours and only clear liquids for 2 hours prior to your procedure.     -You will need to have someone drive you home after your PROCEDURE and remain with you for 24 hours after the PROCEDURE  - The date of your procedure, your are welcome to have one visitor at bedside or remain within 10-15 minutes of [facility]  -You will need to arrive at 1315 on 4-11-24 for your PROCEDURE  -Please contact [facility] at: [phone] with any questions and/or concerns

## 2024-04-11 ENCOUNTER — HOSPITAL ENCOUNTER (OUTPATIENT)
Facility: SURGERY CENTER | Age: 64
Setting detail: HOSPITAL OUTPATIENT SURGERY
Discharge: HOME | End: 2024-04-11
Attending: ANESTHESIOLOGY | Admitting: ANESTHESIOLOGY
Payer: COMMERCIAL

## 2024-04-11 ENCOUNTER — HOSPITAL ENCOUNTER (OUTPATIENT)
Dept: GENERAL RADIOLOGY | Facility: SURGERY CENTER | Age: 64
Setting detail: HOSPITAL OUTPATIENT SURGERY
End: 2024-04-11
Payer: COMMERCIAL

## 2024-04-11 VITALS
WEIGHT: 250 LBS | SYSTOLIC BLOOD PRESSURE: 124 MMHG | RESPIRATION RATE: 16 BRPM | TEMPERATURE: 97.5 F | OXYGEN SATURATION: 95 % | HEIGHT: 74 IN | DIASTOLIC BLOOD PRESSURE: 100 MMHG | HEART RATE: 60 BPM | BODY MASS INDEX: 32.08 KG/M2

## 2024-04-11 DIAGNOSIS — M47.812 ARTHROPATHY OF CERVICAL FACET JOINT: ICD-10-CM

## 2024-04-11 DIAGNOSIS — Z41.9 SURGERY, ELECTIVE: ICD-10-CM

## 2024-04-11 PROCEDURE — 64491 INJ PARAVERT F JNT C/T 2 LEV: CPT | Performed by: ANESTHESIOLOGY

## 2024-04-11 PROCEDURE — 77002 NEEDLE LOCALIZATION BY XRAY: CPT

## 2024-04-11 PROCEDURE — 64490 INJ PARAVERT F JNT C/T 1 LEV: CPT | Performed by: ANESTHESIOLOGY

## 2024-04-11 PROCEDURE — S0260 H&P FOR SURGERY: HCPCS | Performed by: ANESTHESIOLOGY

## 2024-04-11 PROCEDURE — 25010000002 BUPIVACAINE (PF) 0.5 % SOLUTION 10 ML VIAL: Performed by: ANESTHESIOLOGY

## 2024-04-11 PROCEDURE — 76000 FLUOROSCOPY <1 HR PHYS/QHP: CPT

## 2024-04-11 PROCEDURE — 25510000001 IOPAMIDOL 61 % SOLUTION 30 ML VIAL: Performed by: ANESTHESIOLOGY

## 2024-04-11 RX ORDER — DIAZEPAM 5 MG/1
10 TABLET ORAL ONCE
Status: COMPLETED | OUTPATIENT
Start: 2024-04-11 | End: 2024-04-11

## 2024-04-11 RX ADMIN — DIAZEPAM 10 MG: 5 TABLET ORAL at 13:00

## 2024-04-11 NOTE — OP NOTE
LEFT C3-5 Cervical Medial Branch Blockade  [facility]      PREOPERATIVE DIAGNOSIS:  Cervical spondylosis without myelopathy   POSTOPERATIVE DIAGNOSIS:  Same as preoperative diagnosis    PROCEDURE:    Cervical Facet Nerve (medial branch) Blocks, with Fluoroscopy:  LEVELS C3, C4, C5,  to block facet joints C3-4 & C4-5 on the LEFT  12101 - Cervical Facet block, 1st level  95772 - Cervical Facet block, 2nd level    PRE-PROCEDURE DISCUSSION WITH PATIENT:    Risks and complications were discussed with the patient prior to starting the procedure and informed consent was obtained.    SURGEON:  Braden Adams MD    REASON FOR PROCEDURE:    The patient complains of pain that seems to have a significant axial component Painful area identified on exam under fluoroscopy    SEDATION:  , but an IV access was obtained for safety., The patient had higher than average levels of procedural anxiety and the need to provide additional procedural sedation was needed to safely proceed., and he received diazepam 10 mg by mouth in the preoperative area  ANESTHETIC:   Marcaine 0.5%  STEROID:  NONE  TOTAL VOLUME OF SOLUTION:  3 mL    DESCRIPTON OF PROCEDURE:  After obtaining informed consent, the patient was placed in the prone position. IV access was obtained.  EKG, blood pressure, and pulse oximeter were monitored and all sedation was administered by an RN under my direct guidance.  The cervical area was prepped with Chloraprep and draped with sterile barrier. Under fluoroscopic guidance the waists of the C3 & C4 & C5 vertebra on the affected side were identified. Skin and subcutaneous tissue was then anesthetized with 1% lidocaine 1mL at each point. Then spinal needles were introduced under fluoroscopic guidance at the waist of these vertebra on this side. After confirming the position of the needle under fluoroscopic PA and lateral views, and confirming negative aspiration of blood and CSF, 0.25 mL of Omnipaque was  injected.  Proper spread and lack of vascular uptake was demonstrated.  A solution was prepared as above, and 1 mL of that solution was injected very slowly each level.      Needles were removed intact from all levels.  Vitals were stable throughout.       ESTIMATED BLOOD LOSS:  minimal  SPECIMENS:  None    COMPLICATIONS:    No complications were noted., There was no indication of vascular uptake on live injection of contrast dye., and The patient did not have any signs of postprocedure numbness nor weakness.    TOLERANCE & DISCHARGE CONDITION:    The patient tolerated the procedure well.  The patient was transported to the recovery area without difficulties.  The patient was discharged to home under the care of family in stable and satisfactory condition.  The patient did notice improvement in pain on extension and rotation of the cervical spine.  Immediate improvement in range of motion especially rotation to the left.    PLAN OF CARE:  The patient was given our standard instruction sheet.  We discussed that Cervical Medial Branch Blockade is a diagnostic procedure in consideration for radiofrequency ablation if two diagnostic procedures proved to be positive for significant benefit.  If sustained relief of six to eight weeks is obtained, then an alternative plan could be therapeutic cervical medial branch blocks on an as-needed basis.  The patient is asked to keep a pain log hourly for 8 hours postoperatively today.  The patient will Return to clinic 1 wk.  The patient will resume all medications as per the medication reconciliation sheet.

## 2024-04-11 NOTE — DISCHARGE INSTRUCTIONS
[facility] - Post-procedure Instructions          --  While there are no absolute restrictions, it is recommended that you do not perform strenuous activity today. In the morning, you may resume your level of activity as before your block.    --  If you have a band-aid at your injection site, please remove it later today. Observe the area for any redness, swelling, pus-like drainage, or a temperature over 101°. If any of these symptoms occur, please call your doctor at [phone]. If after office hours, leave a message and the on-call provider will return your call.    --  Ice may be applied to your injection site. It is recommended you avoid direct heat (heating pad; hot tub) for 1-2 days.    --  Call [facility] at [phone] if you experience persistent headache, persistent bleeding from the injection site, or severe pain not relieved by heat or oral medication.    --  Do not make important decisions today.    --  Due to the effects of the block and/or the I.V. Sedation, DO NOT drive or operate hazardous machinery for 12 hours.  Local anesthetics may cause numbness after procedure and precautions must be taken with regards to operating equipment as well as with walking, even if ambulating with assistance of another person or with an assistive device.    --  Do not drink alcohol for 12 hours.    -- You may return to work tomorrow, or as directed by your referring doctor.    --  Occasionally you may notice a slight increase in your pain after the procedure. This should start to improve within the next 24-48 hours. Radiofrequency ablation procedure pain may last 3-4 weeks.    --  It may take as long as 3-4 days before you notice a gradual improvement in your pain and/or other symptoms.    -- You may continue to take your prescribed pain medication as needed.    --  Some normal possible side effects of steroid use could include fluid retention, increased blood sugar, dull headache,  increased sweating, increased appetite, mood swings and flushing.    --  Diabetics are recommended to watch their blood glucose level closely for 24-48 hours after the injection.    --  Must stay in PACU for 20 min upon arrival and prove no leg weakness before being discharged.    --  IN THE EVENT OF A LIFE THREATENING EMERGENCY, (CHEST PAIN, BREATHING DIFFICULTIES, PARALYSIS…) YOU SHOULD GO TO YOUR NEAREST EMERGENCY ROOM.    --  You should be contacted by our office within 2-3 days to schedule follow up or next appointment date.  If not contacted within 7 days, please call the office at [phone]

## 2024-04-11 NOTE — H&P
Brief Pre-procedural / Pre-operative H&P        -----    Pertinent Diagnosis:   Left-sided cervical facet arthropathy.  Cervical spondylosis.    Proposed Procedure: Cervical medial branch block anticipated on the left at C3 and C4 and C5      Subjective   Manfred Nunez is a 64 y.o. male  who presents for intervention.  He has a history of neck pain.      History of Present Illness     My partner recently saw him in the office and exquisite left-sided facet palpation tenderness and he also has cervical facet arthritis on imaging.  History of neck pain without individual inciting event or injury but he does tire and has had a lot with his work-related function.  He works as a .  He was referred by his chiropractor because of the known facet arthritis that was not improving with manual technique.    -------    The following portions of the patient's history were reviewed and updated as appropriate: allergies, current medications, past family history, past medical history, past social history, past surgical history and problem list.    Allergies   Allergen Reactions    Clarithromycin Nausea And Vomiting       No current facility-administered medications for this encounter.    No current facility-administered medications on file prior to encounter.     Current Outpatient Medications on File Prior to Encounter   Medication Sig Dispense Refill    amLODIPine (NORVASC) 10 MG tablet Take 1 tablet by mouth Daily.      doxycycline (ADOXA) 50 MG tablet TAKE 1 TABLET BY MOUTH EVERY DAY FOR ACNE ROSACEA      ergocalciferol (ERGOCALCIFEROL) 1.25 MG (46575 UT) capsule ergocalciferol (vitamin D2) 1,250 mcg (50,000 unit) capsule   TAKE 1 CAPSULE BY MOUTH EVERY WEEK AS DIRECTED      losartan (COZAAR) 100 MG tablet Take 1 tablet by mouth Daily.      pantoprazole (PROTONIX) 40 MG EC tablet Take 1 tablet by mouth Every Morning.      simvastatin (ZOCOR) 40 MG tablet Take 1 tablet by mouth Daily.      zolpidem (AMBIEN) 10 MG  "tablet prn      meloxicam (MOBIC) 15 MG tablet Take 1 tablet by mouth Daily As Needed. prn         Patient Active Problem List   Diagnosis    Arthropathy of cervical facet joint    DDD (degenerative disc disease), cervical    Cervical spinal stenosis       Past Medical History:   Diagnosis Date    Low back pain        Past Surgical History:   Procedure Laterality Date    JOINT REPLACEMENT      ROTATOR CUFF REPAIR Right     SHOULDER ARTHROSCOPY W/ LABRAL REPAIR Right     TOTAL KNEE ARTHROPLASTY Bilateral        History reviewed. No pertinent family history.    Social History     Socioeconomic History    Marital status:    Tobacco Use    Smoking status: Never   Substance and Sexual Activity    Alcohol use: Not Currently    Drug use: Never    Sexual activity: Defer       -------       Review of Systems  No Fever, No Chills, No ear pain, No sinus pressure or drainage, No eye pain or drainage, No cough, No SOB, No chest tightness nor chest pain, no palpitations.          Vitals:    04/10/24 1350 04/11/24 1307   BP:  151/95   BP Location:  Left arm   Patient Position:  Lying   Pulse:  69   Resp:  16   Temp:  97.5 °F (36.4 °C)   TempSrc:  Temporal   SpO2:  97%   Weight: 113 kg (250 lb)    Height: 188 cm (74\")          Objective   Physical Exam  VSS, NNR, NCAT, NMNA, NRD, AAOx3.      -------    Assessment & Plan:  - as noted above, the stated intervention is indicated  - Follow-up plan will be noted in the operative report        Office in a week      EMR Dragon/Transcription disclaimer:   Typed items in this encounter note may have been created by electronic transcription/translation software which converts spoken language to printed text. The electronic translation of spoken language may permit erroneous, or at times, nonsensical words or phrases to be inadvertently transcribed; Although I have reviewed the note for such errors, some may still exist.      "

## 2024-04-18 ENCOUNTER — OFFICE VISIT (OUTPATIENT)
Dept: PAIN MEDICINE | Facility: CLINIC | Age: 64
End: 2024-04-18
Payer: COMMERCIAL

## 2024-04-18 ENCOUNTER — PREP FOR SURGERY (OUTPATIENT)
Dept: SURGERY | Facility: SURGERY CENTER | Age: 64
End: 2024-04-18
Payer: COMMERCIAL

## 2024-04-18 VITALS
RESPIRATION RATE: 12 BRPM | SYSTOLIC BLOOD PRESSURE: 129 MMHG | BODY MASS INDEX: 31.18 KG/M2 | OXYGEN SATURATION: 97 % | HEART RATE: 67 BPM | WEIGHT: 243 LBS | DIASTOLIC BLOOD PRESSURE: 83 MMHG | HEIGHT: 74 IN | TEMPERATURE: 96.9 F

## 2024-04-18 DIAGNOSIS — M47.812 ARTHROPATHY OF CERVICAL FACET JOINT: Primary | ICD-10-CM

## 2024-04-18 DIAGNOSIS — M50.30 DDD (DEGENERATIVE DISC DISEASE), CERVICAL: ICD-10-CM

## 2024-04-18 DIAGNOSIS — M48.02 CERVICAL SPINAL STENOSIS: ICD-10-CM

## 2024-04-18 PROCEDURE — 99214 OFFICE O/P EST MOD 30 MIN: CPT | Performed by: PHYSICIAN ASSISTANT

## 2024-04-18 RX ORDER — SODIUM CHLORIDE 0.9 % (FLUSH) 0.9 %
10 SYRINGE (ML) INJECTION AS NEEDED
OUTPATIENT
Start: 2024-04-18

## 2024-04-18 RX ORDER — SODIUM CHLORIDE 0.9 % (FLUSH) 0.9 %
10 SYRINGE (ML) INJECTION EVERY 12 HOURS SCHEDULED
OUTPATIENT
Start: 2024-04-18

## 2024-04-18 NOTE — H&P (VIEW-ONLY)
CHIEF COMPLAINT  CERVICAL MEDIAL BRANCH BLOCK LEFT C3-5   Patient reports 95% pain relief for about 5 days.      Subjective   Manfred Nunez is a 64 y.o. male  who presents to the office for follow-up of procedure.  He completed a #1 diagnostic left C3-5 medial branch block   on 4/11/2024 performed by Dr. Adams for management of pain. Patient reports 95% relief from the procedure x 5 days.  He is noticing very gradual recrudescence of the primarily left-sided cervical spine pain with referred pain occasionally into the left shoulder/trapezius.    Pain today 3/10 VAS in severity.        Neck Pain   This is a chronic problem. The current episode started more than 1 year ago. The problem occurs constantly. The problem has been gradually worsening. The pain is associated with nothing. The pain is present in the left side. The quality of the pain is described as burning and aching (Sharp). The pain is at a severity of 3/10. The pain is moderate. The symptoms are aggravated by position. The pain is Worse during the day. Stiffness is present All day. Pertinent negatives include no chest pain, fever, headaches, numbness or weakness.        PEG Assessment   What number best describes your pain on average in the past week?7  What number best describes how, during the past week, pain has interfered with your enjoyment of life?5  What number best describes how, during the past week, pain has interfered with your general activity?  7    Review of Pertinent Medical Data ---              The following portions of the patient's history were reviewed and updated as appropriate: allergies, current medications, past family history, past medical history, past social history, past surgical history, and problem list.    Review of Systems   Constitutional:  Negative for activity change, fatigue and fever.   HENT:  Negative for congestion.    Respiratory:  Negative for cough and chest tightness.    Cardiovascular:  Negative for chest  "pain.   Gastrointestinal:  Negative for abdominal pain, constipation and diarrhea.   Genitourinary:  Negative for difficulty urinating and dysuria.   Musculoskeletal:  Positive for neck pain.   Neurological:  Negative for dizziness, weakness, light-headedness, numbness and headaches.   Psychiatric/Behavioral:  Negative for agitation, sleep disturbance and suicidal ideas. The patient is not nervous/anxious.      I have reviewed and confirmed the accuracy of the ROS as documented by the MA/LPN/RN MEKA Butterfield   Vitals:    04/18/24 1027   BP: 129/83   BP Location: Left arm   Patient Position: Sitting   Cuff Size: Adult   Pulse: 67   Resp: 12   Temp: 96.9 °F (36.1 °C)   TempSrc: Temporal   SpO2: 97%   Weight: 110 kg (243 lb)   Height: 188 cm (74\")   PainSc:   3         Objective   Physical Exam  Vitals and nursing note reviewed.   Constitutional:       Appearance: Normal appearance. He is normal weight.   HENT:      Head: Normocephalic.   Pulmonary:      Effort: Pulmonary effort is normal.   Musculoskeletal:      Cervical back: Tenderness (EXQUISITE PAIN TO PALPATION OVER THE LEFT CERVICAL FACET JOINT SPACES) present. Pain with movement (PAIN WITH ROM IN ALL PLANES AND ALSO NOTED WITH RIGHT CEVICAL ROTATION), spinous process tenderness and muscular tenderness present. Decreased range of motion.        Back:    Skin:     General: Skin is warm and dry.   Neurological:      General: No focal deficit present.      Mental Status: He is alert and oriented to person, place, and time.      Cranial Nerves: Cranial nerves 2-12 are intact.      Sensory: Sensation is intact.      Motor: Motor function is intact.      Gait: Gait is intact.      Deep Tendon Reflexes:      Reflex Scores:       Tricep reflexes are 2+ on the right side and 2+ on the left side.       Bicep reflexes are 2+ on the right side and 2+ on the left side.       Brachioradialis reflexes are 2+ on the right side and 2+ on the left side.  Psychiatric:         " Mood and Affect: Mood normal.         Behavior: Behavior normal.         Thought Content: Thought content normal.         Judgment: Judgment normal.             Assessment & Plan   Diagnoses and all orders for this visit:    1. Arthropathy of cervical facet joint (Primary)    2. DDD (degenerative disc disease), cervical    3. Cervical spinal stenosis        Manfred Nunez reports a pain score of 3.  Given his pain assessment as noted, treatment options were discussed and the following options were decided upon as a follow-up plan to address the patient's pain: continuation of current treatment plan for pain, patient not interested in pharmacological measures, and use of non-medical modalities (ice, heat, stretching and/or behavior modifications).      --- Follow-up as scheduled for #2 confirmatory left C3-5 MBB  --- Follow-up In 2 weeks for evaluation after injective therapy      Diagnostic Facet Joint Procedure:   MBB     The confirmatory diagnostic facet joint procedure is considered medically reasonable and necessary for the diagnosis and treatment of chronic pain for this patient due to the patient meeting all of the following criteria:    - 1. Moderate to severe chronic neck or low back pain, predominantly axial, that causes functional deficit measured on pain or disability scale.  - 2. Pain present for minimum of 3 months with documented failure to respond to noninvasive conservative management (as tolerated)  - 3. Absence of untreated radiculopathy or neurogenic claudication (except for radiculopathy caused by facet joint synovial cyst)  - 4. There is no non-facet pathology per clinical assessment or radiology studies that could explain the source of the patient’s pain, including but not limited to fracture, tumor, infection, or significant deformity.    The patient has also shown a consistent positive response of at least 80% relief of primary (index) pain (with the duration of relief being consistent with  the agent used).              Dictated utilizing Dragon dictation.

## 2024-04-29 NOTE — SIGNIFICANT NOTE
Patient educated on the following :    - If you are receiving Sedation for your procedure Nothing to Eat 6 hours and only clear liquids for 2 hours prior to your procedure.    -You will need to have someone drive you home after your PROCEDURE and remain with you for 24 hours after the PROCEDURE  - The date of your procedure, your are welcome to have one visitor at bedside or remain within 10-15 minutes of [facility]  -You will need to arrive at 1030 on 4/30 for your PROCEDURE  -Please contact [facility] at: [phone] with any questions and/or concerns

## 2024-04-30 ENCOUNTER — HOSPITAL ENCOUNTER (OUTPATIENT)
Dept: GENERAL RADIOLOGY | Facility: SURGERY CENTER | Age: 64
Setting detail: HOSPITAL OUTPATIENT SURGERY
End: 2024-04-30
Payer: COMMERCIAL

## 2024-04-30 ENCOUNTER — HOSPITAL ENCOUNTER (OUTPATIENT)
Facility: SURGERY CENTER | Age: 64
Setting detail: HOSPITAL OUTPATIENT SURGERY
Discharge: HOME | End: 2024-04-30
Attending: ANESTHESIOLOGY | Admitting: ANESTHESIOLOGY
Payer: COMMERCIAL

## 2024-04-30 VITALS
HEIGHT: 74 IN | RESPIRATION RATE: 20 BRPM | TEMPERATURE: 97.8 F | OXYGEN SATURATION: 96 % | SYSTOLIC BLOOD PRESSURE: 101 MMHG | HEART RATE: 57 BPM | BODY MASS INDEX: 31.18 KG/M2 | DIASTOLIC BLOOD PRESSURE: 74 MMHG | WEIGHT: 243 LBS

## 2024-04-30 DIAGNOSIS — Z41.9 SURGERY, ELECTIVE: ICD-10-CM

## 2024-04-30 DIAGNOSIS — M47.812 ARTHROPATHY OF CERVICAL FACET JOINT: ICD-10-CM

## 2024-04-30 PROCEDURE — 64491 INJ PARAVERT F JNT C/T 2 LEV: CPT | Performed by: ANESTHESIOLOGY

## 2024-04-30 PROCEDURE — 76000 FLUOROSCOPY <1 HR PHYS/QHP: CPT

## 2024-04-30 PROCEDURE — 25010000002 BUPIVACAINE (PF) 0.5 % SOLUTION 10 ML VIAL: Performed by: ANESTHESIOLOGY

## 2024-04-30 PROCEDURE — 64490 INJ PARAVERT F JNT C/T 1 LEV: CPT | Performed by: ANESTHESIOLOGY

## 2024-04-30 PROCEDURE — 77002 NEEDLE LOCALIZATION BY XRAY: CPT

## 2024-04-30 PROCEDURE — 25510000001 IOPAMIDOL 61 % SOLUTION 30 ML VIAL: Performed by: ANESTHESIOLOGY

## 2024-04-30 RX ORDER — DIAZEPAM 5 MG/1
10 TABLET ORAL ONCE
Status: COMPLETED | OUTPATIENT
Start: 2024-04-30 | End: 2024-04-30

## 2024-04-30 RX ADMIN — DIAZEPAM 10 MG: 5 TABLET ORAL at 10:25

## 2024-04-30 NOTE — DISCHARGE INSTRUCTIONS
[facility] [place] [facility] - Post-procedure Instructions          --  While there are no absolute restrictions, it is recommended that you do not perform strenuous activity today. In the morning, you may resume your level of activity as before your block.    --  If you have a band-aid at your injection site, please remove it later today. Observe the area for any redness, swelling, pus-like drainage, or a temperature over 101°. If any of these symptoms occur, please call your doctor at [phone]. If after office hours, leave a message and the on-call provider will return your call.    --  Ice may be applied to your injection site. It is recommended you avoid direct heat (heating pad; hot tub) for 1-2 days.    --  Call [facility] [place] at [phone] if you experience persistent headache, persistent bleeding from the injection site, or severe pain not relieved by heat or oral medication.    --  Do not make important decisions today.    --  Due to the effects of the block and/or the I.V. Sedation, DO NOT drive or operate hazardous machinery for 12 hours.  Local anesthetics may cause numbness after procedure and precautions must be taken with regards to operating equipment as well as with walking, even if ambulating with assistance of another person or with an assistive device.    --  Do not drink alcohol for 12 hours.    -- You may return to work tomorrow, or as directed by your referring doctor.    --  Occasionally you may notice a slight increase in your pain after the procedure. This should start to improve within the next 24-48 hours. Radiofrequency ablation procedure pain may last 3-4 weeks.    --  It may take as long as 3-4 days before you notice a gradual improvement in your pain and/or other symptoms.    -- You may continue to take your prescribed pain medication as needed.    --  Some normal possible side effects of steroid use could include fluid retention, increased blood sugar, dull headache,  increased sweating, increased appetite, mood swings and flushing.    --  Diabetics are recommended to watch their blood glucose level closely for 24-48 hours after the injection.    --  Must stay in PACU for 20 min upon arrival and prove no leg weakness before being discharged.    --  IN THE EVENT OF A LIFE THREATENING EMERGENCY, (CHEST PAIN, BREATHING DIFFICULTIES, PARALYSIS…) YOU SHOULD GO TO YOUR NEAREST EMERGENCY ROOM.    --  You should be contacted by our office within 2-3 days to schedule follow up or next appointment date.  If not contacted within 7 days, please call the office at [phone]

## 2024-04-30 NOTE — OP NOTE
LEFT C3-5 Cervical Medial Branch Blockade  [facility]      PREOPERATIVE DIAGNOSIS:  Cervical spondylosis without myelopathy   POSTOPERATIVE DIAGNOSIS:  Same as preoperative diagnosis    PROCEDURE:    Cervical Facet Nerve (medial branch) Blocks, with Fluoroscopy:  LEVELS C3, C4, C5,  to block facet joints C3-4 & C4-5 on the LEFT  47627 - Cervical Facet block, 1st level  66394 - Cervical Facet block, 2nd level    PRE-PROCEDURE DISCUSSION WITH PATIENT:    Risks and complications were discussed with the patient prior to starting the procedure and informed consent was obtained.    SURGEON:  Braden Adams MD    REASON FOR PROCEDURE:    The patient complains of pain that seems to have a significant axial component Previous diagnostic positivity of a Cervical Medial Branch Blockade at the same levels    SEDATION:  The patient had higher than average levels of procedural anxiety and the need to provide additional procedural sedation was needed to safely proceed. and he had 10mg oral diazepam in the preop area  ANESTHETIC:   Marcaine 0.5%  STEROID:  NONE  TOTAL VOLUME OF SOLUTION:  3 mL    DESCRIPTON OF PROCEDURE:  After obtaining informed consent, the patient was placed in the prone position. IV access was obtained.  EKG, blood pressure, and pulse oximeter were monitored by an RN under my direct guidance.  The cervical area was prepped with Chloraprep and draped with sterile barrier. Under fluoroscopic guidance the waists of the C3 & C4 & C5 vertebra on the affected side were identified. Skin and subcutaneous tissue was then anesthetized with 1% lidocaine 1mL at each point. Then spinal needles were introduced under fluoroscopic guidance at the waist of these vertebra on this side. After confirming the position of the needle under fluoroscopic PA and lateral views, and confirming negative aspiration of blood and CSF, 0.25 mL of Omnipaque was injected.  Proper spread and lack of vascular uptake was  demonstrated.  A solution was prepared as above, and 1 mL of that solution was injected very slowly each level.      Needles were removed intact from all levels.  Vitals were stable throughout.       ESTIMATED BLOOD LOSS:  minimal  SPECIMENS:  None    COMPLICATIONS:    No complications were noted., There was no indication of vascular uptake on live injection of contrast dye., and The patient did not have any signs of postprocedure numbness nor weakness.    TOLERANCE & DISCHARGE CONDITION:    The patient tolerated the procedure well.  The patient was transported to the recovery area without difficulties.  The patient was discharged to home under the care of family in stable and satisfactory condition.  The patient did notice improvement in pain on extension and rotation of the cervical spine.    PLAN OF CARE:  The patient was given our standard instruction sheet.  We discussed that Cervical Medial Branch Blockade is a diagnostic procedure in consideration for radiofrequency ablation if two diagnostic procedures proved to be positive for significant benefit.  If sustained relief of six to eight weeks is obtained, then an alternative plan could be therapeutic cervical medial branch blocks on an as-needed basis.  The patient is asked to keep a pain log hourly for 8 hours postoperatively today.  The patient will Return to clinic 1 wk.  The patient will resume all medications as per the medication reconciliation sheet.

## 2024-05-08 ENCOUNTER — OFFICE VISIT (OUTPATIENT)
Dept: PAIN MEDICINE | Facility: CLINIC | Age: 64
End: 2024-05-08
Payer: COMMERCIAL

## 2024-05-08 VITALS
SYSTOLIC BLOOD PRESSURE: 111 MMHG | BODY MASS INDEX: 31.03 KG/M2 | WEIGHT: 241.8 LBS | HEART RATE: 75 BPM | HEIGHT: 74 IN | DIASTOLIC BLOOD PRESSURE: 80 MMHG | TEMPERATURE: 97.8 F | OXYGEN SATURATION: 97 %

## 2024-05-08 DIAGNOSIS — M50.30 DDD (DEGENERATIVE DISC DISEASE), CERVICAL: ICD-10-CM

## 2024-05-08 DIAGNOSIS — M48.02 CERVICAL SPINAL STENOSIS: ICD-10-CM

## 2024-05-08 DIAGNOSIS — M47.812 ARTHROPATHY OF CERVICAL FACET JOINT: Primary | ICD-10-CM

## 2024-05-08 PROCEDURE — 99214 OFFICE O/P EST MOD 30 MIN: CPT | Performed by: PHYSICIAN ASSISTANT

## 2024-05-08 NOTE — H&P (VIEW-ONLY)
CHIEF COMPLAINT  F/U neck pain- CERVICAL MEDIAL BRANCH BLOCK LEFT C3-C5 #2- patient states that he had 80% improvement for 4 days.          Subjective   Manfred Nunez is a 64 y.o. male  who presents to the office for follow-up of procedure.  He completed a #2 left C3-C5 cervical MBB   on 4/30/2024 performed by Dr. Adams for management of neck pain. Patient reports 80% relief from the procedure x 4 days.  He has since noted recrudescence of pain primarily affecting the left side of the cervical spine referred occasionally into the left shoulder and trapezius which is significantly aggravated by his employment as a .    The patient is scheduled to undergo resection of a tendon/nerve within his left foot on 5/10/2024 with Dr. Wolff    Pain today 3/10 VAS in severity.    Neck Pain   This is a chronic problem. The current episode started more than 1 year ago. The problem occurs constantly. The problem has been gradually worsening. The pain is associated with nothing. The pain is present in the left side. The quality of the pain is described as burning and aching (Sharp). The pain is at a severity of 3/10. The pain is moderate. The symptoms are aggravated by position. The pain is Worse during the day. Stiffness is present All day. Pertinent negatives include no chest pain, fever, headaches, numbness or weakness.        PEG Assessment   What number best describes your pain on average in the past week?5  What number best describes how, during the past week, pain has interfered with your enjoyment of life?3  What number best describes how, during the past week, pain has interfered with your general activity?  3    Review of Pertinent Medical Data ---  No new imaging to review on today    The following portions of the patient's history were reviewed and updated as appropriate: allergies, current medications, past family history, past medical history, past social history, past surgical history, and problem  "list.    Review of Systems   Constitutional:  Negative for activity change, chills, fatigue and fever.   HENT:  Negative for congestion.    Eyes:  Negative for visual disturbance.   Respiratory:  Negative for chest tightness and shortness of breath.    Cardiovascular:  Negative for chest pain.   Gastrointestinal:  Negative for abdominal pain, constipation and diarrhea.   Genitourinary:  Negative for difficulty urinating and dysuria.   Musculoskeletal:  Positive for neck pain. Negative for back pain.   Neurological:  Negative for dizziness, weakness, light-headedness, numbness and headaches.   Psychiatric/Behavioral:  Negative for agitation and sleep disturbance. The patient is not nervous/anxious.      I have reviewed and confirmed the accuracy of the ROS as documented by the MA/LPN/RN MEKA Butterfield   Vitals:    05/08/24 1034   BP: 111/80   Pulse: 75   Temp: 97.8 °F (36.6 °C)   SpO2: 97%   Weight: 110 kg (241 lb 12.8 oz)   Height: 188 cm (74\")   PainSc:   3   PainLoc: Neck         Objective   Physical Exam  Vitals and nursing note reviewed.   Constitutional:       Appearance: Normal appearance. He is normal weight.   HENT:      Head: Normocephalic.   Pulmonary:      Effort: Pulmonary effort is normal.   Musculoskeletal:      Cervical back: Tenderness (EXQUISITE PAIN TO PALPATION OVER THE LEFT CERVICAL FACET JOINT SPACES) present. Pain with movement (PAIN WITH ROM IN ALL PLANES AND ALSO NOTED WITH RIGHT CEVICAL ROTATION), spinous process tenderness and muscular tenderness present. Decreased range of motion.        Back:    Skin:     General: Skin is warm and dry.   Neurological:      General: No focal deficit present.      Mental Status: He is alert and oriented to person, place, and time.      Cranial Nerves: Cranial nerves 2-12 are intact.      Sensory: Sensation is intact.      Motor: Motor function is intact.      Gait: Gait is intact.      Deep Tendon Reflexes:      Reflex Scores:       Tricep reflexes are " 2+ on the right side and 2+ on the left side.       Bicep reflexes are 2+ on the right side and 2+ on the left side.       Brachioradialis reflexes are 2+ on the right side and 2+ on the left side.  Psychiatric:         Mood and Affect: Mood normal.         Behavior: Behavior normal.         Thought Content: Thought content normal.         Judgment: Judgment normal.             Assessment & Plan   Diagnoses and all orders for this visit:    1. Arthropathy of cervical facet joint (Primary)    2. DDD (degenerative disc disease), cervical    3. Cervical spinal stenosis        Manfred Nunez reports a pain score of 3.  Given his pain assessment as noted, treatment options were discussed and the following options were decided upon as a follow-up plan to address the patient's pain: continuation of current treatment plan for pain, patient not interested in pharmacological measures, and use of non-medical modalities (ice, heat, stretching and/or behavior modifications).      --- Based on favorable response to left C3-C5 cervical medial branch blocks the patient return for radiofrequency ablation.  The risk and benefits of the procedure have been discussed with the patient and all questions have been addressed.  --- Follow-up 6 weeks after undergoing injective therapy      Thermal Radiofrequency Destruction    This initial thermal radiofrequency destruction (RFA) of cervical, thoracic, or lumbar paravertebral facet joint (medial branch) nerves is considered medically reasonable and necessary as this patient has met the criteria of having at least two (2) medically reasonable and necessary diagnostic MBBs, with each one providing a consistent minimum of 80% sustained relief of primary (index) pain (with the duration of relief being consistent with the agent used).    This repeat thermal radiofrequency destruction (RFA) of cervical, thoracic, or lumbar paravertebral facet joint (medial branch) nerves at the same anatomical site  is considered medically reasonable and necessary as this patient has met the criteria of having a minimum of consistent 50% improvement in pain for at least six (6) months or at least 50% consistent improvement in the ability to perform previously painful movements and ADLs as compared to baseline measurement using the same scale.                   Dictated utilizing Dragon dictation.

## 2024-05-10 ENCOUNTER — LAB REQUISITION (OUTPATIENT)
Dept: LAB | Facility: HOSPITAL | Age: 64
End: 2024-05-10
Payer: COMMERCIAL

## 2024-05-10 DIAGNOSIS — T87.33: ICD-10-CM

## 2024-05-10 PROCEDURE — 88304 TISSUE EXAM BY PATHOLOGIST: CPT | Performed by: PODIATRIST

## 2024-05-11 LAB
LAB AP CASE REPORT: NORMAL
LAB AP CLINICAL INFORMATION: NORMAL
PATH REPORT.FINAL DX SPEC: NORMAL
PATH REPORT.GROSS SPEC: NORMAL

## 2024-06-04 ENCOUNTER — PREP FOR SURGERY (OUTPATIENT)
Dept: OTHER | Facility: HOSPITAL | Age: 64
End: 2024-06-04
Payer: COMMERCIAL

## 2024-06-04 DIAGNOSIS — M47.812 ARTHROPATHY OF CERVICAL FACET JOINT: Primary | ICD-10-CM

## 2024-06-04 RX ORDER — FENTANYL CITRATE 50 UG/ML
50 INJECTION, SOLUTION INTRAMUSCULAR; INTRAVENOUS ONCE
Status: CANCELLED | OUTPATIENT
Start: 2024-06-05

## 2024-06-04 RX ORDER — LIDOCAINE HYDROCHLORIDE 20 MG/ML
5 INJECTION, SOLUTION INFILTRATION; PERINEURAL ONCE
Status: CANCELLED | OUTPATIENT
Start: 2024-06-05

## 2024-06-04 RX ORDER — LIDOCAINE HYDROCHLORIDE 10 MG/ML
5 INJECTION, SOLUTION INFILTRATION; PERINEURAL ONCE
Status: CANCELLED | OUTPATIENT
Start: 2024-06-05

## 2024-06-04 RX ORDER — BUPIVACAINE HYDROCHLORIDE 5 MG/ML
10 INJECTION, SOLUTION EPIDURAL; INTRACAUDAL ONCE
Status: CANCELLED | OUTPATIENT
Start: 2024-06-05

## 2024-06-04 RX ORDER — MIDAZOLAM HYDROCHLORIDE 1 MG/ML
2 INJECTION INTRAMUSCULAR; INTRAVENOUS ONCE
Status: CANCELLED | OUTPATIENT
Start: 2024-06-05

## 2024-06-05 ENCOUNTER — HOSPITAL ENCOUNTER (OUTPATIENT)
Dept: PAIN MEDICINE | Facility: HOSPITAL | Age: 64
Discharge: HOME | End: 2024-06-05
Payer: COMMERCIAL

## 2024-06-05 ENCOUNTER — HOSPITAL ENCOUNTER (OUTPATIENT)
Dept: GENERAL RADIOLOGY | Facility: HOSPITAL | Age: 64
Discharge: HOME | End: 2024-06-05
Payer: COMMERCIAL

## 2024-06-05 VITALS
SYSTOLIC BLOOD PRESSURE: 116 MMHG | HEART RATE: 62 BPM | DIASTOLIC BLOOD PRESSURE: 80 MMHG | RESPIRATION RATE: 16 BRPM | TEMPERATURE: 97.7 F | OXYGEN SATURATION: 97 %

## 2024-06-05 DIAGNOSIS — M47.812 ARTHROPATHY OF CERVICAL FACET JOINT: ICD-10-CM

## 2024-06-05 DIAGNOSIS — R52 PAIN: ICD-10-CM

## 2024-06-05 PROCEDURE — 25010000002 MIDAZOLAM PER 1 MG: Performed by: ANESTHESIOLOGY

## 2024-06-05 PROCEDURE — 77003 FLUOROGUIDE FOR SPINE INJECT: CPT

## 2024-06-05 PROCEDURE — 25010000002 FENTANYL CITRATE (PF) 50 MCG/ML SOLUTION: Performed by: ANESTHESIOLOGY

## 2024-06-05 PROCEDURE — 25010000002 BUPIVACAINE (PF) 0.5 % SOLUTION: Performed by: ANESTHESIOLOGY

## 2024-06-05 RX ORDER — SODIUM CHLORIDE 0.9 % (FLUSH) 0.9 %
10 SYRINGE (ML) INJECTION EVERY 12 HOURS SCHEDULED
Status: DISCONTINUED | OUTPATIENT
Start: 2024-06-05 | End: 2024-06-06 | Stop reason: HOSPADM

## 2024-06-05 RX ORDER — SODIUM CHLORIDE 0.9 % (FLUSH) 0.9 %
10 SYRINGE (ML) INJECTION AS NEEDED
Status: DISCONTINUED | OUTPATIENT
Start: 2024-06-05 | End: 2024-06-06 | Stop reason: HOSPADM

## 2024-06-05 RX ORDER — FENTANYL CITRATE 50 UG/ML
50 INJECTION, SOLUTION INTRAMUSCULAR; INTRAVENOUS ONCE
Status: COMPLETED | OUTPATIENT
Start: 2024-06-05 | End: 2024-06-05

## 2024-06-05 RX ORDER — SODIUM CHLORIDE 9 MG/ML
40 INJECTION, SOLUTION INTRAVENOUS AS NEEDED
Status: DISCONTINUED | OUTPATIENT
Start: 2024-06-05 | End: 2024-06-06 | Stop reason: HOSPADM

## 2024-06-05 RX ORDER — BUPIVACAINE HYDROCHLORIDE 5 MG/ML
10 INJECTION, SOLUTION EPIDURAL; INTRACAUDAL ONCE
Status: COMPLETED | OUTPATIENT
Start: 2024-06-05 | End: 2024-06-05

## 2024-06-05 RX ORDER — MIDAZOLAM HYDROCHLORIDE 1 MG/ML
2 INJECTION INTRAMUSCULAR; INTRAVENOUS ONCE
Status: COMPLETED | OUTPATIENT
Start: 2024-06-05 | End: 2024-06-05

## 2024-06-05 RX ORDER — LIDOCAINE HYDROCHLORIDE 10 MG/ML
0.5 INJECTION, SOLUTION INFILTRATION; PERINEURAL ONCE AS NEEDED
Status: DISCONTINUED | OUTPATIENT
Start: 2024-06-05 | End: 2024-06-06 | Stop reason: HOSPADM

## 2024-06-05 RX ORDER — LIDOCAINE HYDROCHLORIDE 20 MG/ML
5 INJECTION, SOLUTION INFILTRATION; PERINEURAL ONCE
Status: COMPLETED | OUTPATIENT
Start: 2024-06-05 | End: 2024-06-05

## 2024-06-05 RX ORDER — LIDOCAINE HYDROCHLORIDE 10 MG/ML
5 INJECTION, SOLUTION INFILTRATION; PERINEURAL ONCE
Status: DISCONTINUED | OUTPATIENT
Start: 2024-06-05 | End: 2024-06-06 | Stop reason: HOSPADM

## 2024-06-05 RX ADMIN — FENTANYL CITRATE 50 MCG: 50 INJECTION, SOLUTION INTRAMUSCULAR; INTRAVENOUS at 09:24

## 2024-06-05 RX ADMIN — LIDOCAINE HYDROCHLORIDE 5 ML: 20 INJECTION, SOLUTION EPIDURAL; INFILTRATION; INTRACAUDAL; PERINEURAL at 09:27

## 2024-06-05 RX ADMIN — MIDAZOLAM 2 MG: 1 INJECTION INTRAMUSCULAR; INTRAVENOUS at 09:22

## 2024-06-05 RX ADMIN — BUPIVACAINE HYDROCHLORIDE 10 ML: 5 INJECTION, SOLUTION EPIDURAL; INTRACAUDAL; PERINEURAL at 09:25

## 2024-06-05 NOTE — PROCEDURES
Procedures    Cervical Facet Nerve (Medial Branch) Radiofrequency Lesioning    [facility]      PREOPERATIVE DIAGNOSIS:  Cervical spondylosis without myelopathy   POSTOPERATIVE DIAGNOSIS:  Same as preoperative diagnosis    PROCEDURE:    Cervical Facet Nerve (medial branch) RF, with Fluoroscopy:      LEVELS: left  C3, C4, and C5    PRE-PROCEDURE DISCUSSION WITH PATIENT:    Risks and complications were discussed with the patient prior to starting the procedure and informed consent was obtained.  We discussed various topics, including but not limited to bleeding, infection, injury, nerve injury, paralysis, coma, death, postprocedural soreness or painful flare, worsening of clinical picture, lack of pain relief.  We discussed the previous positive diagnostic nature of medial branch blockades.      SURGEON:  Braden Adams MD    REASON FOR PROCEDURE:    The patient presents with chronic axial neck pain. The patient underwent 2 left cervical medial branch blocks with diagnostically positive relief. Given the patient’s significant pain relief, it is diagnostic that we have likely found the source of the patient’s pain; therefore, radiofrequency ablation of those nerves has been indicated for therapeutic pain relief.    SEDATION:  Versed 2mg & Fentanyl 50 mcg IV  TIME OF PROCEDURE:  After administration of the IV sedative, the intraoperative procedure time was (7418-5159) 20 minutes.      ANESTHETIC:   Lidocaine 2%  STEROID:   NONE  TOTAL VOLUME OF SOLUTION:  4 ml      DESCRIPTON OF PROCEDURE:  After obtaining informed consent, the patient was placed in the prone position. IV access was obtained.  EKG, blood pressure, and pulse oximeter were monitored and any & all sedation was administered by an RN under my direct guidance.  The cervical area was prepped with Chloraprep and draped with sterile barrier.     Under fluoroscopic guidance the waists of the above mentioned vertebra were identified and marked at the  lateral margin of the vertebrae on the AP fluoroscopic view.  Skin and subcutaneous tissue were then anesthetized with 1% lidocaine 1mL at each point.  Then RF cannula needles were sequentially introduced under fluoroscopic guidance to the waists of these vertebrae contacting periosteum on the lateral edge of the vertebra on the AP fluroscopic view. After confirming the position of the needle under fluoroscopic PA and lateral views, confirming the needle position in the center of the trapezoid at each level, and confirming negative aspiration of blood and CSF, testing was initiated.  There was a lack of radicular stimulation on each needle at 3v and 2Hz.      Then, in each needle, 1mL of the aforementioned local anesthetic was instilled.  After 2 minutes had elapsed, Radiofrequency thermal lesioning was initiated for 2 minutes at 80 degrees Celsius.      Needles were removed intact from all levels.  Vitals were stable throughout.       ESTIMATED BLOOD LOSS:  minimal  SPECIMENS:  None    COMPLICATIONS:    No complications were noted. and The patient did not have any signs of postprocedure numbness nor weakness.    TOLERANCE & DISCHARGE CONDITION:    The patient tolerated the procedure well.  The patient was transported to the recovery area without difficulties.  The patient was discharged to home under the care of family in stable and satisfactory condition.  The patient did notice improvement in pain on extension and rotation of the cervical spine.      PLAN OF CARE:  The patient was given our standard instruction sheet.  We discussed that Cervical Medial Branch Blockade is a diagnostic procedure in consideration for radiofrequency ablation if two diagnostic procedures proved to be positive for significant benefit.  If sustained relief of six to eight weeks is obtained, then an alternative plan could be therapeutic cervical medial branch blocks on an as-needed basis.  The patient is asked to keep a pain log hourly  for 8 hours postoperatively today.  The patient will Return to clinic 6 wks.  The patient will resume all medications as per the medication reconciliation sheet.

## 2024-06-05 NOTE — DISCHARGE INSTRUCTIONS
[facility] [place] [facility] - Post-procedure Instructions          --  While there are no absolute restrictions, it is recommended that you do not perform strenuous activity today. In the morning, you may resume your level of activity as before your block.    --  If you have a band-aid at your injection site, please remove it later today. Observe the area for any redness, swelling, pus-like drainage, or a temperature over 101°. If any of these symptoms occur, please call your doctor at [phone]. If after office hours, leave a message and the on-call provider will return your call.    --  Ice may be applied to your injection site. It is recommended you avoid direct heat (heating pad; hot tub) for 1-2 days.    --  Call [facility] [place] at [phone] if you experience persistent headache, persistent bleeding from the injection site, or severe pain not relieved by heat or oral medication.    --  Do not make important decisions today.    --  Due to the effects of the block and/or the I.V. Sedation, DO NOT drive or operate hazardous machinery for 12 hours.  Local anesthetics may cause numbness after procedure and precautions must be taken with regards to operating equipment as well as with walking, even if ambulating with assistance of another person or with an assistive device.    --  Do not drink alcohol for 12 hours.    -- You may return to work tomorrow, or as directed by your referring doctor.    --  Occasionally you may notice a slight increase in your pain after the procedure. This should start to improve within the next 24-48 hours. Radiofrequency ablation procedure pain may last 3-4 weeks.    --  It may take as long as 3-4 days before you notice a gradual improvement in your pain and/or other symptoms.    -- You may continue to take your prescribed pain medication as needed.    --  Some normal possible side effects of steroid use could include fluid retention, increased blood sugar, dull headache,  increased sweating, increased appetite, mood swings and flushing.    --  Diabetics are recommended to watch their blood glucose level closely for 24-48 hours after the injection.    --  Must stay in PACU for 20 min upon arrival and prove no leg weakness before being discharged.    --  IN THE EVENT OF A LIFE THREATENING EMERGENCY, (CHEST PAIN, BREATHING DIFFICULTIES, PARALYSIS…) YOU SHOULD GO TO YOUR NEAREST EMERGENCY ROOM.    --  You should be contacted by our office within 2-3 days to schedule follow up or next appointment date.  If not contacted within 7 days, please call the office at [phone]

## 2024-06-05 NOTE — INTERVAL H&P NOTE
H&P reviewed. The patient was examined and there are no changes to the H&P.    Mallampati:   III (soft and hard palate and base of uvula visible)    ASA Physical Status Classification: ASA 2 - Patient with mild systemic disease with no functional limitations    Preprocedure/preassessment plan is as noted.      Preprocedure assessment/presedation assessment is noted as the H&P/H&P update as part of this Epic chart encounter.  Preassessment plan and preprocedure airway assessment are noted.  Immediate in the room airway assessment is unchanged from the preprocedure assessment performed in the preoperative area a few minutes ago.

## 2024-07-02 ENCOUNTER — TRANSCRIBE ORDERS (OUTPATIENT)
Dept: ADMINISTRATIVE | Facility: HOSPITAL | Age: 64
End: 2024-07-02
Payer: COMMERCIAL

## 2024-07-02 DIAGNOSIS — N18.31 CKD STAGE 3A, GFR 45-59 ML/MIN: Primary | ICD-10-CM

## 2024-07-26 ENCOUNTER — HOSPITAL ENCOUNTER (OUTPATIENT)
Dept: ULTRASOUND IMAGING | Facility: HOSPITAL | Age: 64
Discharge: HOME | End: 2024-07-26
Admitting: INTERNAL MEDICINE
Payer: COMMERCIAL

## 2024-07-26 DIAGNOSIS — N18.31 CKD STAGE 3A, GFR 45-59 ML/MIN: ICD-10-CM

## 2024-07-26 PROCEDURE — 76775 US EXAM ABDO BACK WALL LIM: CPT

## (undated) DEVICE — NDL SPINE 25G 31/2IN BLU

## (undated) DEVICE — EPIDURAL TRAY: Brand: MEDLINE INDUSTRIES, INC.

## (undated) DEVICE — GLV SURG TRIUMPH PF LTX 7.5 STRL